# Patient Record
Sex: MALE | Race: BLACK OR AFRICAN AMERICAN | NOT HISPANIC OR LATINO | Employment: FULL TIME | URBAN - METROPOLITAN AREA
[De-identification: names, ages, dates, MRNs, and addresses within clinical notes are randomized per-mention and may not be internally consistent; named-entity substitution may affect disease eponyms.]

---

## 2021-08-13 ENCOUNTER — APPOINTMENT (OUTPATIENT)
Dept: LAB | Facility: MEDICAL CENTER | Age: 36
End: 2021-08-13
Payer: COMMERCIAL

## 2021-08-13 ENCOUNTER — OFFICE VISIT (OUTPATIENT)
Dept: FAMILY MEDICINE CLINIC | Facility: CLINIC | Age: 36
End: 2021-08-13
Payer: COMMERCIAL

## 2021-08-13 VITALS
DIASTOLIC BLOOD PRESSURE: 80 MMHG | TEMPERATURE: 96.5 F | WEIGHT: 178.4 LBS | BODY MASS INDEX: 24.16 KG/M2 | SYSTOLIC BLOOD PRESSURE: 128 MMHG | HEIGHT: 72 IN

## 2021-08-13 DIAGNOSIS — E55.9 VITAMIN D DEFICIENCY: ICD-10-CM

## 2021-08-13 DIAGNOSIS — D17.9 LIPOMA, UNSPECIFIED SITE: Primary | ICD-10-CM

## 2021-08-13 DIAGNOSIS — D17.9 LIPOMA, UNSPECIFIED SITE: ICD-10-CM

## 2021-08-13 DIAGNOSIS — E78.49 OTHER HYPERLIPIDEMIA: ICD-10-CM

## 2021-08-13 DIAGNOSIS — Z00.00 ROUTINE ADULT HEALTH MAINTENANCE: ICD-10-CM

## 2021-08-13 PROBLEM — K40.90 INGUINAL HERNIA, LEFT: Status: ACTIVE | Noted: 2018-03-21

## 2021-08-13 PROBLEM — D72.819 LEUKOPENIA: Status: ACTIVE | Noted: 2018-04-13

## 2021-08-13 PROBLEM — E78.5 HYPERLIPIDEMIA: Status: ACTIVE | Noted: 2020-05-28

## 2021-08-13 LAB
25(OH)D3 SERPL-MCNC: 47 NG/ML (ref 30–100)
ALBUMIN SERPL BCP-MCNC: 3.7 G/DL (ref 3.5–5)
ALP SERPL-CCNC: 44 U/L (ref 46–116)
ALT SERPL W P-5'-P-CCNC: 39 U/L (ref 12–78)
ANION GAP SERPL CALCULATED.3IONS-SCNC: 4 MMOL/L (ref 4–13)
AST SERPL W P-5'-P-CCNC: 38 U/L (ref 5–45)
BASOPHILS # BLD AUTO: 0.03 THOUSANDS/ΜL (ref 0–0.1)
BASOPHILS NFR BLD AUTO: 1 % (ref 0–1)
BILIRUB SERPL-MCNC: 0.6 MG/DL (ref 0.2–1)
BUN SERPL-MCNC: 10 MG/DL (ref 5–25)
CALCIUM SERPL-MCNC: 9.1 MG/DL (ref 8.3–10.1)
CHLORIDE SERPL-SCNC: 111 MMOL/L (ref 100–108)
CHOLEST SERPL-MCNC: 136 MG/DL (ref 50–200)
CO2 SERPL-SCNC: 27 MMOL/L (ref 21–32)
CREAT SERPL-MCNC: 1.01 MG/DL (ref 0.6–1.3)
EOSINOPHIL # BLD AUTO: 0.05 THOUSAND/ΜL (ref 0–0.61)
EOSINOPHIL NFR BLD AUTO: 2 % (ref 0–6)
ERYTHROCYTE [DISTWIDTH] IN BLOOD BY AUTOMATED COUNT: 12.6 % (ref 11.6–15.1)
EST. AVERAGE GLUCOSE BLD GHB EST-MCNC: 108 MG/DL
GFR SERPL CREATININE-BSD FRML MDRD: 110 ML/MIN/1.73SQ M
GLUCOSE P FAST SERPL-MCNC: 80 MG/DL (ref 65–99)
HBA1C MFR BLD: 5.4 %
HCT VFR BLD AUTO: 42 % (ref 36.5–49.3)
HDLC SERPL-MCNC: 68 MG/DL
HGB BLD-MCNC: 13.1 G/DL (ref 12–17)
IMM GRANULOCYTES # BLD AUTO: 0.01 THOUSAND/UL (ref 0–0.2)
IMM GRANULOCYTES NFR BLD AUTO: 0 % (ref 0–2)
LDLC SERPL CALC-MCNC: 61 MG/DL (ref 0–100)
LYMPHOCYTES # BLD AUTO: 1.33 THOUSANDS/ΜL (ref 0.6–4.47)
LYMPHOCYTES NFR BLD AUTO: 39 % (ref 14–44)
MCH RBC QN AUTO: 28.5 PG (ref 26.8–34.3)
MCHC RBC AUTO-ENTMCNC: 31.2 G/DL (ref 31.4–37.4)
MCV RBC AUTO: 92 FL (ref 82–98)
MONOCYTES # BLD AUTO: 0.29 THOUSAND/ΜL (ref 0.17–1.22)
MONOCYTES NFR BLD AUTO: 9 % (ref 4–12)
NEUTROPHILS # BLD AUTO: 1.71 THOUSANDS/ΜL (ref 1.85–7.62)
NEUTS SEG NFR BLD AUTO: 49 % (ref 43–75)
NRBC BLD AUTO-RTO: 0 /100 WBCS
PLATELET # BLD AUTO: 178 THOUSANDS/UL (ref 149–390)
PMV BLD AUTO: 11.4 FL (ref 8.9–12.7)
POTASSIUM SERPL-SCNC: 3.8 MMOL/L (ref 3.5–5.3)
PROT SERPL-MCNC: 7 G/DL (ref 6.4–8.2)
RBC # BLD AUTO: 4.59 MILLION/UL (ref 3.88–5.62)
SODIUM SERPL-SCNC: 142 MMOL/L (ref 136–145)
TRIGL SERPL-MCNC: 34 MG/DL
TSH SERPL DL<=0.05 MIU/L-ACNC: 0.98 UIU/ML (ref 0.36–3.74)
WBC # BLD AUTO: 3.42 THOUSAND/UL (ref 4.31–10.16)

## 2021-08-13 PROCEDURE — 36415 COLL VENOUS BLD VENIPUNCTURE: CPT

## 2021-08-13 PROCEDURE — 80053 COMPREHEN METABOLIC PANEL: CPT

## 2021-08-13 PROCEDURE — 80061 LIPID PANEL: CPT

## 2021-08-13 PROCEDURE — 83036 HEMOGLOBIN GLYCOSYLATED A1C: CPT

## 2021-08-13 PROCEDURE — 99385 PREV VISIT NEW AGE 18-39: CPT | Performed by: FAMILY MEDICINE

## 2021-08-13 PROCEDURE — 99203 OFFICE O/P NEW LOW 30 MIN: CPT | Performed by: FAMILY MEDICINE

## 2021-08-13 PROCEDURE — 82306 VITAMIN D 25 HYDROXY: CPT

## 2021-08-13 PROCEDURE — 84443 ASSAY THYROID STIM HORMONE: CPT

## 2021-08-13 PROCEDURE — 85025 COMPLETE CBC W/AUTO DIFF WBC: CPT

## 2021-08-13 RX ORDER — ATORVASTATIN CALCIUM 40 MG/1
40 TABLET, FILM COATED ORAL DAILY
COMMUNITY
Start: 2021-05-11 | End: 2022-02-14

## 2021-08-13 RX ORDER — ATORVASTATIN CALCIUM 40 MG/1
TABLET, FILM COATED ORAL
COMMUNITY
Start: 2021-05-11 | End: 2021-08-13 | Stop reason: SDUPTHER

## 2021-08-13 RX ORDER — ATORVASTATIN CALCIUM 20 MG/1
20 TABLET, FILM COATED ORAL DAILY
Qty: 90 TABLET | Refills: 1 | Status: SHIPPED | OUTPATIENT
Start: 2021-08-13 | End: 2022-02-18 | Stop reason: SDUPTHER

## 2021-08-13 RX ORDER — ERGOCALCIFEROL 1.25 MG/1
50000 CAPSULE ORAL WEEKLY
COMMUNITY
End: 2022-02-14

## 2021-08-16 PROBLEM — Z00.00 ROUTINE ADULT HEALTH MAINTENANCE: Status: ACTIVE | Noted: 2021-08-16

## 2021-08-16 NOTE — PROGRESS NOTES
Assessment/Plan:    79-year-old male with: Annual well visit discussed various safety and health maintenance issues including healthy diet like the Mediterranean diet exercise healthy weight as tolerated ample sleep stress reduction strategies discussed supportive care return parameters    No problem-specific Assessment & Plan notes found for this encounter  Diagnoses and all orders for this visit:    Lipoma, unspecified site  -     Ambulatory referral to Plastic Surgery; Future  -     Comprehensive metabolic panel; Future  -     CBC and differential; Future  -     TSH, 3rd generation with Free T4 reflex; Future  -     Lipid Panel with Direct LDL reflex; Future  -     Hemoglobin A1C; Future  -     Vitamin D 25 hydroxy; Future    Other hyperlipidemia  -     atorvastatin (LIPITOR) 20 mg tablet; Take 1 tablet (20 mg total) by mouth daily  -     Comprehensive metabolic panel; Future  -     CBC and differential; Future  -     TSH, 3rd generation with Free T4 reflex; Future  -     Lipid Panel with Direct LDL reflex; Future  -     Hemoglobin A1C; Future  -     Vitamin D 25 hydroxy; Future    Vitamin D deficiency  -     Comprehensive metabolic panel; Future  -     CBC and differential; Future  -     TSH, 3rd generation with Free T4 reflex; Future  -     Lipid Panel with Direct LDL reflex; Future  -     Hemoglobin A1C; Future  -     Vitamin D 25 hydroxy; Future    Routine adult health maintenance    Other orders  -     atorvastatin (LIPITOR) 40 mg tablet; Take 40 mg by mouth daily  -     Discontinue: atorvastatin (LIPITOR) 40 mg tablet; TAKE 1 TABLET BY MOUTH EVERY DAY AT NIGHT  -     hydrocortisone 2 5 % cream; Apply topically 2 (two) times a day  -     ergocalciferol (VITAMIN D2) 50,000 units; Take 50,000 Units by mouth once a week          Subjective:     Chief Complaint   Patient presents with    Physical Exam    ck chest lump     also with lump on back of head        Patient ID: Betty Balderas is a 38 y o  male     Patient is a 26-year-old male who presents for an annual well visit he admits being physically active in generally eats healthy diet he sleeps well no other health maintenance issues      The following portions of the patient's history were reviewed and updated as appropriate: allergies, current medications, past family history, past medical history, past social history, past surgical history and problem list     Review of Systems   Constitutional: Negative  HENT: Negative  Eyes: Negative  Respiratory: Negative  Cardiovascular: Negative  Gastrointestinal: Negative  Endocrine: Negative  Genitourinary: Negative  Musculoskeletal: Negative  Allergic/Immunologic: Negative  Neurological: Negative  Hematological: Negative  Psychiatric/Behavioral: Negative  All other systems reviewed and are negative  Objective:      /80 (BP Location: Left arm, Patient Position: Sitting, Cuff Size: Standard)   Temp (!) 96 5 °F (35 8 °C) (Tympanic)   Ht 6' (1 829 m)   Wt 80 9 kg (178 lb 6 4 oz)   BMI 24 20 kg/m²          Physical Exam  Constitutional:       Appearance: He is well-developed  HENT:      Head: Atraumatic  Right Ear: External ear normal       Left Ear: External ear normal    Eyes:      Conjunctiva/sclera: Conjunctivae normal       Pupils: Pupils are equal, round, and reactive to light  Cardiovascular:      Rate and Rhythm: Normal rate and regular rhythm  Heart sounds: Normal heart sounds  Pulmonary:      Effort: Pulmonary effort is normal  No respiratory distress  Breath sounds: Normal breath sounds  Abdominal:      General: Bowel sounds are normal  There is no distension  Palpations: Abdomen is soft  Tenderness: There is no abdominal tenderness  There is no guarding or rebound  Musculoskeletal:         General: Normal range of motion  Cervical back: Normal range of motion  Skin:     General: Skin is warm and dry  Neurological:      Mental Status: He is alert and oriented to person, place, and time  Cranial Nerves: No cranial nerve deficit  Psychiatric:         Behavior: Behavior normal          Thought Content:  Thought content normal          Judgment: Judgment normal

## 2021-08-16 NOTE — PROGRESS NOTES
Assessment/Plan:    42-year-old male with:  Lipoma hyperlipidemia and vitamin-D deficiency will refer to Plastic surgery will continue current medications discussed supportive care return parameters    No problem-specific Assessment & Plan notes found for this encounter  Diagnoses and all orders for this visit:    Lipoma, unspecified site  -     Ambulatory referral to Plastic Surgery; Future  -     Comprehensive metabolic panel; Future  -     CBC and differential; Future  -     TSH, 3rd generation with Free T4 reflex; Future  -     Lipid Panel with Direct LDL reflex; Future  -     Hemoglobin A1C; Future  -     Vitamin D 25 hydroxy; Future    Other hyperlipidemia  -     atorvastatin (LIPITOR) 20 mg tablet; Take 1 tablet (20 mg total) by mouth daily  -     Comprehensive metabolic panel; Future  -     CBC and differential; Future  -     TSH, 3rd generation with Free T4 reflex; Future  -     Lipid Panel with Direct LDL reflex; Future  -     Hemoglobin A1C; Future  -     Vitamin D 25 hydroxy; Future    Vitamin D deficiency  -     Comprehensive metabolic panel; Future  -     CBC and differential; Future  -     TSH, 3rd generation with Free T4 reflex; Future  -     Lipid Panel with Direct LDL reflex; Future  -     Hemoglobin A1C; Future  -     Vitamin D 25 hydroxy; Future    Other orders  -     atorvastatin (LIPITOR) 40 mg tablet; Take 40 mg by mouth daily  -     Discontinue: atorvastatin (LIPITOR) 40 mg tablet; TAKE 1 TABLET BY MOUTH EVERY DAY AT NIGHT  -     hydrocortisone 2 5 % cream; Apply topically 2 (two) times a day  -     ergocalciferol (VITAMIN D2) 50,000 units; Take 50,000 Units by mouth once a week          Subjective:     Chief Complaint   Patient presents with    Physical Exam    ck chest lump     also with lump on back of head        Patient ID: Poppy Wilson is a 38 y o  male      Patient is a 42-year-old male who presents to establish care in this practice he admits a lipoma on the back of his neck on the right side he would like removed he also has hyperlipidemia vitamin-D deficiency admits being stable medications denies acute complaints no fevers chills nausea vomiting no other complaints at this time      The following portions of the patient's history were reviewed and updated as appropriate: allergies, current medications, past family history, past medical history, past social history, past surgical history and problem list     Review of Systems   Constitutional: Negative  HENT: Negative  Eyes: Negative  Respiratory: Negative  Cardiovascular: Negative  Gastrointestinal: Negative  Endocrine: Negative  Genitourinary: Negative  Musculoskeletal: Negative  Allergic/Immunologic: Negative  Neurological: Negative  Hematological: Negative  Psychiatric/Behavioral: Negative  All other systems reviewed and are negative  Objective:      /80 (BP Location: Left arm, Patient Position: Sitting, Cuff Size: Standard)   Temp (!) 96 5 °F (35 8 °C) (Tympanic)   Ht 6' (1 829 m)   Wt 80 9 kg (178 lb 6 4 oz)   BMI 24 20 kg/m²          Physical Exam  Constitutional:       Appearance: He is well-developed  HENT:      Head: Atraumatic  Right Ear: External ear normal       Left Ear: External ear normal    Eyes:      Conjunctiva/sclera: Conjunctivae normal       Pupils: Pupils are equal, round, and reactive to light  Cardiovascular:      Rate and Rhythm: Normal rate and regular rhythm  Heart sounds: Normal heart sounds  Pulmonary:      Effort: Pulmonary effort is normal  No respiratory distress  Breath sounds: Normal breath sounds  Abdominal:      General: Bowel sounds are normal  There is no distension  Palpations: Abdomen is soft  Tenderness: There is no abdominal tenderness  There is no guarding or rebound  Musculoskeletal:         General: Normal range of motion  Cervical back: Normal range of motion     Skin:     General: Skin is warm and dry  Neurological:      Mental Status: He is alert and oriented to person, place, and time  Cranial Nerves: No cranial nerve deficit  Psychiatric:         Behavior: Behavior normal          Thought Content:  Thought content normal          Judgment: Judgment normal

## 2021-09-02 ENCOUNTER — TELEPHONE (OUTPATIENT)
Dept: FAMILY MEDICINE CLINIC | Facility: CLINIC | Age: 36
End: 2021-09-02

## 2022-01-26 ENCOUNTER — TELEPHONE (OUTPATIENT)
Dept: FAMILY MEDICINE CLINIC | Facility: CLINIC | Age: 37
End: 2022-01-26

## 2022-01-26 NOTE — TELEPHONE ENCOUNTER
patient called the office stating he would like to see a hernia doctor to having pain where he has his hernia surgery

## 2022-02-02 ENCOUNTER — LAB REQUISITION (OUTPATIENT)
Dept: LAB | Facility: HOSPITAL | Age: 37
End: 2022-02-02
Payer: COMMERCIAL

## 2022-02-02 DIAGNOSIS — D48.5 NEOPLASM OF UNCERTAIN BEHAVIOR OF SKIN: ICD-10-CM

## 2022-02-02 PROCEDURE — 88304 TISSUE EXAM BY PATHOLOGIST: CPT | Performed by: PATHOLOGY

## 2022-02-14 ENCOUNTER — CONSULT (OUTPATIENT)
Dept: SURGERY | Facility: CLINIC | Age: 37
End: 2022-02-14
Payer: COMMERCIAL

## 2022-02-14 VITALS
DIASTOLIC BLOOD PRESSURE: 80 MMHG | HEART RATE: 78 BPM | SYSTOLIC BLOOD PRESSURE: 120 MMHG | WEIGHT: 181.8 LBS | TEMPERATURE: 97.6 F | HEIGHT: 72 IN | BODY MASS INDEX: 24.62 KG/M2

## 2022-02-14 DIAGNOSIS — R10.32 LEFT GROIN PAIN: Primary | ICD-10-CM

## 2022-02-14 PROCEDURE — 99243 OFF/OP CNSLTJ NEW/EST LOW 30: CPT | Performed by: SURGERY

## 2022-02-14 NOTE — PROGRESS NOTES
Assessment/Plan:   Maverick Boykin is a 39 y o male who is here for assessment for right groin pain  He had a open inguinal hernia repair on the left in 2018  Since about October 2021 he has had some groin discomfort with extra strenuous activity  He is set changed his workout regimen  Does not notice a lumbar bulging denies any GI symptoms such as nausea or vomiting  On examination he has no recurrent hernia and a very healthy intact abdominal wall  Plan:  Continue NSAIDs as needed for groin pain, discussed that this is probably lifelong occasional aches and pains in the groin incision but there is no obvious ilioinguinal neuropathy or recurrence  See as needed  ______________________________________________________  CC:Inguinal Hernia (LIH x 4-5 months  No bulges to note  No specific pain but there is discomfort  He noted he has been working out more since June  )    HPI:  Maverick Boykin is a 39 y o male who was referred for evaluation of Inguinal Hernia (LIH x 4-5 months  No bulges to note  No specific pain but there is discomfort  He noted he has been working out more since June  )    Currently doing well  See assessment  Status post left inguinal hernia repair, open, with mesh, in 2018 at an outside hospital   Has changed his exercise regimen  He is a very fit 6 ft gentleman with a BMI of 24  He is very muscular  No blunt or bulge just occasional twinges of pain and he is here to make sure there is no recurrence          ROS:  General ROS: negative  negative for - chills, fatigue, fever or night sweats, weight loss  Respiratory ROS: no cough, shortness of breath, or wheezing  Cardiovascular ROS: no chest pain or dyspnea on exertion  Genito-Urinary ROS: no dysuria, trouble voiding, or hematuria  Musculoskeletal ROS: negative for - gait disturbance, joint pain or muscle pain  Neurological ROS: no TIA or stroke symptoms  Gastrointestinal: see HPI   No abdominal pain, melena, BRB unless specified in HPI  Skin ROS: no new rashes or lesions   Lymphatic ROS: no new adenopathy noted by pt  GYN ROS: see HPI, no new GYN history or bleeding noted  Psy ROS: no new mental or behavioral disturbances       Patient Active Problem List   Diagnosis    Elevation of level of transaminase and lactic acid dehydrogenase (LDH)    Encounter for general adult medical examination without abnormal findings    Hyperlipidemia    Inguinal hernia, left    Leukopenia    Residual hemorrhoidal skin tags    Vitamin D deficiency    Lipoma    Routine adult health maintenance         Allergies:  Patient has no known allergies  Current Outpatient Medications:     atorvastatin (LIPITOR) 20 mg tablet, Take 1 tablet (20 mg total) by mouth daily, Disp: 90 tablet, Rfl: 1    NON FORMULARY, Protein shakes/powders, Disp: , Rfl:     History reviewed  No pertinent past medical history  Past Surgical History:   Procedure Laterality Date    HEMORRHOID SURGERY      HERNIA REPAIR      LIPOMA RESECTION  02/2022       Family History   Problem Relation Age of Onset    Cancer Maternal Grandmother         unknown type    Throat cancer Maternal Grandfather     Dementia Paternal Grandmother         reports that he has never smoked  He has never used smokeless tobacco  He reports previous alcohol use  He reports current drug use  Drug: Marijuana  Labs:   Lab Results   Component Value Date    WBC 3 42 (L) 08/13/2021    HGB 13 1 08/13/2021    HCT 42 0 08/13/2021    MCV 92 08/13/2021     08/13/2021     Lab Results   Component Value Date    K 3 8 08/13/2021     (H) 08/13/2021    CO2 27 08/13/2021    BUN 10 08/13/2021    CREATININE 1 01 08/13/2021    GLUF 80 08/13/2021    CALCIUM 9 1 08/13/2021    AST 38 08/13/2021    ALT 39 08/13/2021    ALKPHOS 44 (L) 08/13/2021    EGFR 110 08/13/2021         Imaging: No new pertinent imaging studies           PHYSICAL EXAM    Vitals:    02/14/22 0851   BP: 120/80   Pulse: 78 Temp: 97 6 °F (36 4 °C)          PHYSICAL EXAM  General Appearance:    Alert, cooperative, no distress,    Head:    Normocephalic without obvious abnormality   Eyes:    PERRL, conjunctiva/corneas clear, EOM's intact        Neck:   Supple, no adenopathy, no JVD   Back:     Symmetric, no spinal or CVA tenderness   Lungs:     Clear to auscultation bilaterally, no wheezing or rhonchi   Heart:    Regular rate and rhythm, S1 and S2 normal, no murmur   Abdomen:     Soft, nontender  Sitting or standing with Valsalva maneuvers there is no hernia bilaterally and particular on the left side  The scar is well healed and intact  Extremities:   Extremities normal  No clubbing, cyanosis or edema   Psych:   Normal Affect   Neurologic:   CNII-XII intact  Strength symmetric, speech intact                                           Some portions of this record may have been generated with voice recognition software  There may be translation, syntax,  or grammatical errors  Occasional wrong word or "sound-a-like" substitutions may have occurred due to the inherent limitations of the voice recognition software  Read the chart carefully and recognize, using context, where substitutions may have occurred  If you have any questions, please contact the dictating provider for clarification or correction, as needed  This encounter has been coded by a non-certified coder         Darrick Romano MD    Date: 2/14/2022 Time: 9:19 AM

## 2022-02-18 ENCOUNTER — OFFICE VISIT (OUTPATIENT)
Dept: FAMILY MEDICINE CLINIC | Facility: CLINIC | Age: 37
End: 2022-02-18
Payer: COMMERCIAL

## 2022-02-18 VITALS
HEART RATE: 89 BPM | OXYGEN SATURATION: 98 % | DIASTOLIC BLOOD PRESSURE: 70 MMHG | TEMPERATURE: 98.6 F | SYSTOLIC BLOOD PRESSURE: 110 MMHG | BODY MASS INDEX: 24.38 KG/M2 | WEIGHT: 180 LBS | HEIGHT: 72 IN

## 2022-02-18 DIAGNOSIS — E78.49 OTHER HYPERLIPIDEMIA: ICD-10-CM

## 2022-02-18 DIAGNOSIS — E55.9 VITAMIN D DEFICIENCY: Primary | ICD-10-CM

## 2022-02-18 PROCEDURE — 99213 OFFICE O/P EST LOW 20 MIN: CPT | Performed by: FAMILY MEDICINE

## 2022-02-18 RX ORDER — CHOLECALCIFEROL (VITAMIN D3) 1250 MCG
1 CAPSULE ORAL WEEKLY
Qty: 12 CAPSULE | Refills: 1 | Status: SHIPPED | OUTPATIENT
Start: 2022-02-18

## 2022-02-18 RX ORDER — ATORVASTATIN CALCIUM 20 MG/1
20 TABLET, FILM COATED ORAL DAILY
Qty: 90 TABLET | Refills: 1 | Status: SHIPPED | OUTPATIENT
Start: 2022-02-18

## 2022-02-21 NOTE — PROGRESS NOTES
Assessment/Plan:    70-year-old male with: Vitamin-D deficiency and hyperlipidemia will continue current medications discussed supportive care return parameters follow-up in 6 months    No problem-specific Assessment & Plan notes found for this encounter  Diagnoses and all orders for this visit:    Vitamin D deficiency  -     Cholecalciferol (Vitamin D3) 1 25 MG (77540 UT) CAPS; Take 1 capsule (50,000 Units total) by mouth once a week    Other hyperlipidemia  -     atorvastatin (LIPITOR) 20 mg tablet; Take 1 tablet (20 mg total) by mouth daily          Subjective:     Chief Complaint   Patient presents with    Follow-up    Medication Management     pt needs vit d rx    Medication Refill    Hiatal Hernia    Genetics Lab Prep Work        Patient ID: Sandy Carpio is a 39 y o  male  Patient is a 70-year-old male who presents for follow-up on vitamin-D deficiency hyperlipidemia admits being stable medications denies acute complaints no fevers chills nausea vomiting tolerating p o  intake no other complaints at this time      The following portions of the patient's history were reviewed and updated as appropriate: allergies, current medications, past family history, past medical history, past social history, past surgical history and problem list     Review of Systems   Constitutional: Negative  HENT: Negative  Eyes: Negative  Respiratory: Negative  Cardiovascular: Negative  Gastrointestinal: Negative  Endocrine: Negative  Genitourinary: Negative  Musculoskeletal: Negative  Allergic/Immunologic: Negative  Neurological: Negative  Hematological: Negative  Psychiatric/Behavioral: Negative  All other systems reviewed and are negative  Objective:      /70   Pulse 89   Temp 98 6 °F (37 °C)   Ht 6' (1 829 m)   Wt 81 6 kg (180 lb)   SpO2 98%   BMI 24 41 kg/m²          Physical Exam  Constitutional:       Appearance: He is well-developed     HENT:      Head: Atraumatic  Right Ear: External ear normal       Left Ear: External ear normal    Eyes:      Conjunctiva/sclera: Conjunctivae normal       Pupils: Pupils are equal, round, and reactive to light  Cardiovascular:      Rate and Rhythm: Normal rate and regular rhythm  Heart sounds: Normal heart sounds  Pulmonary:      Effort: Pulmonary effort is normal  No respiratory distress  Breath sounds: Normal breath sounds  Abdominal:      General: There is no distension  Palpations: Abdomen is soft  Tenderness: There is no abdominal tenderness  There is no guarding or rebound  Musculoskeletal:         General: Normal range of motion  Cervical back: Normal range of motion  Skin:     General: Skin is warm and dry  Neurological:      Mental Status: He is alert and oriented to person, place, and time  Cranial Nerves: No cranial nerve deficit  Psychiatric:         Behavior: Behavior normal          Thought Content: Thought content normal          Judgment: Judgment normal            Depression Screening and Follow-up Plan: Patient was screened for depression during today's encounter  They screened negative with a PHQ-2 score of 0

## 2022-05-04 ENCOUNTER — OFFICE VISIT (OUTPATIENT)
Dept: PODIATRY | Facility: CLINIC | Age: 37
End: 2022-05-04
Payer: COMMERCIAL

## 2022-05-04 VITALS
HEART RATE: 55 BPM | SYSTOLIC BLOOD PRESSURE: 122 MMHG | WEIGHT: 173 LBS | DIASTOLIC BLOOD PRESSURE: 77 MMHG | BODY MASS INDEX: 23.46 KG/M2

## 2022-05-04 DIAGNOSIS — M76.822 POSTERIOR TIBIAL TENDON DYSFUNCTION, BILATERAL: Primary | ICD-10-CM

## 2022-05-04 DIAGNOSIS — M76.821 POSTERIOR TIBIAL TENDON DYSFUNCTION, BILATERAL: Primary | ICD-10-CM

## 2022-05-04 PROCEDURE — 99203 OFFICE O/P NEW LOW 30 MIN: CPT | Performed by: PODIATRIST

## 2022-05-04 NOTE — PROGRESS NOTES
This patient was seen on 5/4/22  My role is Foot , Ankle, and Wound Specialist    SUBJECTIVE    Chief Complaint:  Foot deformity     Patient ID: Austin Jones is a 39 y o  male  Daniel Cutting is here for the first time with a cc of "turning out" of his Right foot that he feels is worsening and also causing Right hip pain  He admits to being "flat footed" since youth  He denies foot pain  The following portions of the patient's history were reviewed and updated as appropriate: allergies, current medications, past family history, past medical history, past social history, past surgical history and problem list     Review of Systems   Constitutional: Negative  Cardiovascular: Negative  Musculoskeletal: Positive for gait problem  OBJECTIVE      /77   Pulse 55   Wt 78 5 kg (173 lb)   BMI 23 46 kg/m²     Foot/Ankle Musculoskeletal Exam    General      Neurological: alert      General additional comments: I note ROM of the hindfoot and midfoot are WNL bilaterally other than some gastrosoleal equinus-restricted dorsiflexion of the ankles  I note on weightbearing, he has almost complete collapse of his medial arch, abduction of the foot on the leg, Helbing's sign and eversion of the heels Right greater than Left  He is able to single and double heel rise bilaterally  I note a mild to moderate response to the Thohoyandou test Left greater than Right  Physical Exam  Vitals and nursing note reviewed  Constitutional:       General: He is not in acute distress  Appearance: Normal appearance  He is not ill-appearing, toxic-appearing or diaphoretic  HENT:      Head: Normocephalic and atraumatic  Pulmonary:      Effort: Pulmonary effort is normal       Breath sounds: Normal breath sounds  Musculoskeletal:         General: Deformity present  Comments: I note ROM of the hindfoot and midfoot are WNL bilaterally other than some gastrosoleal equinus-restricted dorsiflexion of the ankles   I note on weightbearing, he has almost complete collapse of his medial arch, abduction of the foot on the leg, Helbing's sign and eversion of the heels Right greater than Left  He is able to single and double heel rise bilaterally  I note a mild to moderate response to the Thohoyandou test Left greater than Right  Neurological:      Mental Status: He is alert  ASSESSMENT     Diagnoses and all orders for this visit:    Posterior tibial tendon dysfunction, bilateral  -     Device Prior Authorization; Future         Problem List Items Addressed This Visit        Musculoskeletal and Integument    Posterior tibial tendon dysfunction, bilateral - Primary    Relevant Orders    Device Prior Authorization              PLAN  I feel he has asymptomatic posterior tibial tendon dysfunction  It may be the cause of his hip pain  I recommend orthotic control  While it's painless now; his deformity is so profound that I feel joint, tendon pain are inevitable in the future if this is left unchecked  He will be scheduled for orthotic casting   If his hip pain doesn't resolve, he will need to see an orthopedic specialist

## 2022-06-23 ENCOUNTER — PROCEDURE VISIT (OUTPATIENT)
Dept: PODIATRY | Facility: CLINIC | Age: 37
End: 2022-06-23
Payer: COMMERCIAL

## 2022-06-23 VITALS — BODY MASS INDEX: 23.73 KG/M2 | WEIGHT: 175 LBS

## 2022-06-23 DIAGNOSIS — M76.821 POSTERIOR TIBIAL TENDON DYSFUNCTION, BILATERAL: Primary | ICD-10-CM

## 2022-06-23 DIAGNOSIS — M76.822 POSTERIOR TIBIAL TENDON DYSFUNCTION, BILATERAL: Primary | ICD-10-CM

## 2022-06-23 PROCEDURE — S0395 IMPRESSION CASTING FT: HCPCS | Performed by: PODIATRIST

## 2022-06-23 NOTE — PROGRESS NOTES
Date Patient Seen: 6/23/22       Subjective:     Chief Complaint:  Foot pain     Patient ID: Sofia Josemanuel is a 39 y o  male  Malon Perez is here to be casted for foot orthotics  The following portions of the patient's history were reviewed and updated as appropriate: allergies, current medications, past family history, past medical history, past social history, past surgical history and problem list     Review of Systems   Constitutional: Negative  Cardiovascular: Negative  Musculoskeletal: Positive for gait problem  Objective: Wt 79 4 kg (175 lb)   BMI 23 73 kg/m²        Physical Exam  Vitals and nursing note reviewed  Constitutional:       General: He is not in acute distress  Appearance: Normal appearance  He is not ill-appearing, toxic-appearing or diaphoretic  HENT:      Head: Normocephalic and atraumatic  Pulmonary:      Effort: Pulmonary effort is normal       Breath sounds: Normal breath sounds  Musculoskeletal:         General: Deformity present  Comments: I note ROM of the hindfoot and midfoot are WNL bilaterally other than some gastrosoleal equinus-restricted dorsiflexion of the ankles  I note on weightbearing, he has almost complete collapse of his medial arch, abduction of the foot on the leg, Helbing's sign and eversion of the heels Right greater than Left  He is able to single and double heel rise bilaterally  I note a mild to moderate response to the Roverto Pelt test Left greater than Right  Neurological:      Mental Status: He is alert  Diagnoses and all orders for this visit:    Posterior tibial tendon dysfunction, bilateral         Assessment:  Pes planus    Plan:     The patient was placed in the supine position  Plaster negative mold impressions were caputured of both feet with the the subtalar joints held in neutral position and the midtarsal joints maximally pronated and locked       The negative molds, when properly hardened, were removed from the patient and sent with prescription instructions to the orthotic laboratory       The patient will be notified when orthotics are available for dispensing at the office

## 2022-08-17 ENCOUNTER — OFFICE VISIT (OUTPATIENT)
Dept: PODIATRY | Facility: CLINIC | Age: 37
End: 2022-08-17
Payer: COMMERCIAL

## 2022-08-17 VITALS
SYSTOLIC BLOOD PRESSURE: 112 MMHG | WEIGHT: 178 LBS | DIASTOLIC BLOOD PRESSURE: 70 MMHG | HEART RATE: 70 BPM | BODY MASS INDEX: 24.14 KG/M2

## 2022-08-17 DIAGNOSIS — M76.822 POSTERIOR TIBIAL TENDON DYSFUNCTION, BILATERAL: Primary | ICD-10-CM

## 2022-08-17 DIAGNOSIS — M76.821 POSTERIOR TIBIAL TENDON DYSFUNCTION, BILATERAL: Primary | ICD-10-CM

## 2022-08-17 PROCEDURE — 99213 OFFICE O/P EST LOW 20 MIN: CPT | Performed by: PODIATRIST

## 2022-08-17 NOTE — PROGRESS NOTES
This patient was seen on 8/17/22  My role is Foot , Ankle, and Wound Specialist    SUBJECTIVE    Chief Complaint:  Pes planus     Patient ID: Matt Garcia is a 39 y o  male  Porfirio Power is here for the cc of "turning out" of his Right foot that he feels is worsening and also causing Right hip pain  He admits to being "flat footed" since youth  He denies foot pain  He's now wearing the custom foot orthotics  He notes the feet don't turn out and overall his feet are asymptomatic  His hip is still bothering him sometimes  The following portions of the patient's history were reviewed and updated as appropriate: allergies, current medications, past family history, past medical history, past social history, past surgical history and problem list     Review of Systems   Constitutional: Negative  Cardiovascular: Negative  Musculoskeletal: Positive for gait problem  OBJECTIVE      /70   Pulse 70   Wt 80 7 kg (178 lb)   BMI 24 14 kg/m²     Foot/Ankle Musculoskeletal Exam    General      Neurological: alert      General additional comments: I note ROM of the hindfoot and midfoot are WNL bilaterally other than some gastrosoleal equinus-restricted dorsiflexion of the ankles  I note on weightbearing, he has almost complete collapse of his medial arch, abduction of the foot on the leg, Helbing's sign and eversion of the heels Right greater than Left  He is able to single and double heel rise bilaterally  I note a mild to moderate response to the Thohoyandou test Left greater than Right  Physical Exam  Vitals and nursing note reviewed  Constitutional:       General: He is not in acute distress  Appearance: Normal appearance  He is not ill-appearing, toxic-appearing or diaphoretic  HENT:      Head: Normocephalic and atraumatic  Pulmonary:      Effort: Pulmonary effort is normal       Breath sounds: Normal breath sounds  Musculoskeletal:         General: Deformity present        Comments: I note ROM of the hindfoot and midfoot are WNL bilaterally other than some gastrosoleal equinus-restricted dorsiflexion of the ankles  I note on weightbearing, he has almost complete collapse of his medial arch, abduction of the foot on the leg, Helbing's sign and eversion of the heels Right greater than Left  He is able to single and double heel rise bilaterally  I note a mild to moderate response to the Buster Peacemaker test Left greater than Right  Neurological:      Mental Status: He is alert  ASSESSMENT     Diagnoses and all orders for this visit:    Posterior tibial tendon dysfunction, bilateral         Problem List Items Addressed This Visit        Musculoskeletal and Integument    Posterior tibial tendon dysfunction, bilateral - Primary              PLAN    The orthotics are effective for him  I did recommend he see an orthopedist for the hip pain  He's discharged from my service prn recurrence of foot discomfort

## 2022-08-19 ENCOUNTER — OFFICE VISIT (OUTPATIENT)
Dept: FAMILY MEDICINE CLINIC | Facility: CLINIC | Age: 37
End: 2022-08-19
Payer: COMMERCIAL

## 2022-08-19 ENCOUNTER — APPOINTMENT (OUTPATIENT)
Dept: LAB | Facility: MEDICAL CENTER | Age: 37
End: 2022-08-19
Payer: COMMERCIAL

## 2022-08-19 VITALS
SYSTOLIC BLOOD PRESSURE: 110 MMHG | TEMPERATURE: 97.9 F | WEIGHT: 178 LBS | HEIGHT: 73 IN | RESPIRATION RATE: 18 BRPM | BODY MASS INDEX: 23.59 KG/M2 | HEART RATE: 67 BPM | DIASTOLIC BLOOD PRESSURE: 70 MMHG | OXYGEN SATURATION: 99 %

## 2022-08-19 DIAGNOSIS — E55.9 VITAMIN D DEFICIENCY: ICD-10-CM

## 2022-08-19 DIAGNOSIS — M25.512 ARTHRALGIA OF LEFT ACROMIOCLAVICULAR JOINT: Primary | ICD-10-CM

## 2022-08-19 DIAGNOSIS — M25.551 RIGHT HIP PAIN: ICD-10-CM

## 2022-08-19 DIAGNOSIS — Z00.00 ROUTINE ADULT HEALTH MAINTENANCE: ICD-10-CM

## 2022-08-19 DIAGNOSIS — M25.512 ARTHRALGIA OF LEFT ACROMIOCLAVICULAR JOINT: ICD-10-CM

## 2022-08-19 DIAGNOSIS — E78.49 OTHER HYPERLIPIDEMIA: ICD-10-CM

## 2022-08-19 DIAGNOSIS — M25.532 LEFT WRIST PAIN: ICD-10-CM

## 2022-08-19 LAB
25(OH)D3 SERPL-MCNC: 22.4 NG/ML (ref 30–100)
ALBUMIN SERPL BCP-MCNC: 3.8 G/DL (ref 3.5–5)
ALP SERPL-CCNC: 54 U/L (ref 46–116)
ALT SERPL W P-5'-P-CCNC: 20 U/L (ref 12–78)
ANION GAP SERPL CALCULATED.3IONS-SCNC: 3 MMOL/L (ref 4–13)
AST SERPL W P-5'-P-CCNC: 18 U/L (ref 5–45)
BASOPHILS # BLD AUTO: 0.03 THOUSANDS/ΜL (ref 0–0.1)
BASOPHILS NFR BLD AUTO: 1 % (ref 0–1)
BILIRUB SERPL-MCNC: 0.38 MG/DL (ref 0.2–1)
BUN SERPL-MCNC: 14 MG/DL (ref 5–25)
CALCIUM SERPL-MCNC: 9 MG/DL (ref 8.3–10.1)
CHLORIDE SERPL-SCNC: 108 MMOL/L (ref 96–108)
CHOLEST SERPL-MCNC: 221 MG/DL
CO2 SERPL-SCNC: 28 MMOL/L (ref 21–32)
CREAT SERPL-MCNC: 1.18 MG/DL (ref 0.6–1.3)
EOSINOPHIL # BLD AUTO: 0.03 THOUSAND/ΜL (ref 0–0.61)
EOSINOPHIL NFR BLD AUTO: 1 % (ref 0–6)
ERYTHROCYTE [DISTWIDTH] IN BLOOD BY AUTOMATED COUNT: 12.1 % (ref 11.6–15.1)
EST. AVERAGE GLUCOSE BLD GHB EST-MCNC: 114 MG/DL
GFR SERPL CREATININE-BSD FRML MDRD: 78 ML/MIN/1.73SQ M
GLUCOSE P FAST SERPL-MCNC: 90 MG/DL (ref 65–99)
HBA1C MFR BLD: 5.6 %
HCT VFR BLD AUTO: 45.4 % (ref 36.5–49.3)
HDLC SERPL-MCNC: 63 MG/DL
HGB BLD-MCNC: 14.2 G/DL (ref 12–17)
IMM GRANULOCYTES # BLD AUTO: 0 THOUSAND/UL (ref 0–0.2)
IMM GRANULOCYTES NFR BLD AUTO: 0 % (ref 0–2)
LDLC SERPL CALC-MCNC: 151 MG/DL (ref 0–100)
LYMPHOCYTES # BLD AUTO: 1.47 THOUSANDS/ΜL (ref 0.6–4.47)
LYMPHOCYTES NFR BLD AUTO: 48 % (ref 14–44)
MCH RBC QN AUTO: 28.5 PG (ref 26.8–34.3)
MCHC RBC AUTO-ENTMCNC: 31.3 G/DL (ref 31.4–37.4)
MCV RBC AUTO: 91 FL (ref 82–98)
MONOCYTES # BLD AUTO: 0.26 THOUSAND/ΜL (ref 0.17–1.22)
MONOCYTES NFR BLD AUTO: 9 % (ref 4–12)
NEUTROPHILS # BLD AUTO: 1.26 THOUSANDS/ΜL (ref 1.85–7.62)
NEUTS SEG NFR BLD AUTO: 41 % (ref 43–75)
NRBC BLD AUTO-RTO: 0 /100 WBCS
PLATELET # BLD AUTO: 204 THOUSANDS/UL (ref 149–390)
PMV BLD AUTO: 11.3 FL (ref 8.9–12.7)
POTASSIUM SERPL-SCNC: 4.2 MMOL/L (ref 3.5–5.3)
PROT SERPL-MCNC: 7.4 G/DL (ref 6.4–8.4)
RBC # BLD AUTO: 4.98 MILLION/UL (ref 3.88–5.62)
SODIUM SERPL-SCNC: 139 MMOL/L (ref 135–147)
TRIGL SERPL-MCNC: 33 MG/DL
TSH SERPL DL<=0.05 MIU/L-ACNC: 0.9 UIU/ML (ref 0.45–4.5)
WBC # BLD AUTO: 3.05 THOUSAND/UL (ref 4.31–10.16)

## 2022-08-19 PROCEDURE — 85025 COMPLETE CBC W/AUTO DIFF WBC: CPT

## 2022-08-19 PROCEDURE — 80061 LIPID PANEL: CPT

## 2022-08-19 PROCEDURE — 99214 OFFICE O/P EST MOD 30 MIN: CPT | Performed by: FAMILY MEDICINE

## 2022-08-19 PROCEDURE — 82306 VITAMIN D 25 HYDROXY: CPT

## 2022-08-19 PROCEDURE — 84443 ASSAY THYROID STIM HORMONE: CPT

## 2022-08-19 PROCEDURE — 80053 COMPREHEN METABOLIC PANEL: CPT

## 2022-08-19 PROCEDURE — 83036 HEMOGLOBIN GLYCOSYLATED A1C: CPT

## 2022-08-19 PROCEDURE — 36415 COLL VENOUS BLD VENIPUNCTURE: CPT

## 2022-08-19 PROCEDURE — 99395 PREV VISIT EST AGE 18-39: CPT | Performed by: FAMILY MEDICINE

## 2022-08-19 RX ORDER — ATORVASTATIN CALCIUM 20 MG/1
20 TABLET, FILM COATED ORAL DAILY
Qty: 90 TABLET | Refills: 1 | Status: SHIPPED | OUTPATIENT
Start: 2022-08-19

## 2022-08-19 RX ORDER — CHOLECALCIFEROL (VITAMIN D3) 1250 MCG
1 CAPSULE ORAL WEEKLY
Qty: 12 CAPSULE | Refills: 1 | Status: SHIPPED | OUTPATIENT
Start: 2022-08-19

## 2022-08-22 NOTE — PROGRESS NOTES
Assessment/Plan:    58-year-old male with: left shoulder pain right hip pain along with left wrist pain and hyperlipidemia  Will check x-rays discussed heat and cold stretching anti-inflammatories will refer to physical therapy will continue medications as well  Regarding well visit discussed various safety and health maintenance issues including healthy diet like the Mediterranean diet exercise healthy weight as tolerated ample sleep stress reduction strategies discussed supportive care return parameters    No problem-specific Assessment & Plan notes found for this encounter  Diagnoses and all orders for this visit:    Arthralgia of left acromioclavicular joint  -     XR shoulder 2+ vw left; Future  -     XR wrist 3+ vw left; Future  -     XR hip/pelv 2-3 vws right if performed; Future  -     Ambulatory Referral to Physical Therapy; Future  -     CBC and differential; Future  -     Comprehensive metabolic panel; Future  -     TSH, 3rd generation with Free T4 reflex; Future  -     Lipid Panel with Direct LDL reflex; Future  -     Hemoglobin A1C; Future    Right hip pain  -     XR shoulder 2+ vw left; Future  -     XR wrist 3+ vw left; Future  -     XR hip/pelv 2-3 vws right if performed; Future  -     Ambulatory Referral to Physical Therapy; Future  -     CBC and differential; Future  -     Comprehensive metabolic panel; Future  -     TSH, 3rd generation with Free T4 reflex; Future  -     Lipid Panel with Direct LDL reflex; Future  -     Hemoglobin A1C; Future    Left wrist pain  -     XR shoulder 2+ vw left; Future  -     XR wrist 3+ vw left; Future  -     XR hip/pelv 2-3 vws right if performed; Future  -     Ambulatory Referral to Physical Therapy; Future  -     CBC and differential; Future  -     Comprehensive metabolic panel; Future  -     TSH, 3rd generation with Free T4 reflex; Future  -     Lipid Panel with Direct LDL reflex;  Future  -     Hemoglobin A1C; Future    Routine adult health maintenance  - CBC and differential; Future  -     Comprehensive metabolic panel; Future  -     TSH, 3rd generation with Free T4 reflex; Future  -     Lipid Panel with Direct LDL reflex; Future  -     Hemoglobin A1C; Future    Other hyperlipidemia  -     atorvastatin (LIPITOR) 20 mg tablet; Take 1 tablet (20 mg total) by mouth daily    Vitamin D deficiency  -     Cholecalciferol (Vitamin D3) 1 25 MG (19298 UT) CAPS; Take 1 capsule (50,000 Units total) by mouth once a week  -     Vitamin D 25 hydroxy; Future          Subjective:     Chief Complaint   Patient presents with    Well Check     Annual exam     Shoulder Pain    Hip Pain     Right side has been bothering him     Hernia        Patient ID: Pablo Bill is a 39 y o  male  Patient is a 19-year-old male who presents complaining of left shoulder pain right hip pain along with left wrist pain and hyperlipidemia he admits that it is been present off and on with activity over the past several months no fevers chills nausea vomiting tolerating p o  intake no other complaints at this time  Patient also here for annual well visit he admits being physically active generally eats healthy diet he sleeps well no other health maintenance issues      The following portions of the patient's history were reviewed and updated as appropriate: allergies, current medications, past family history, past medical history, past social history, past surgical history and problem list     Review of Systems   Constitutional: Negative  HENT: Negative  Eyes: Negative  Respiratory: Negative  Cardiovascular: Negative  Gastrointestinal: Negative  Endocrine: Negative  Genitourinary: Negative  Musculoskeletal: Positive for arthralgias and myalgias  Allergic/Immunologic: Negative  Neurological: Negative  Hematological: Negative  Psychiatric/Behavioral: Negative  All other systems reviewed and are negative          Objective:      /70 (BP Location: Right arm, Patient Position: Sitting, Cuff Size: Standard)   Pulse 67   Temp 97 9 °F (36 6 °C)   Resp 18   Ht 6' 0 5" (1 842 m)   Wt 80 7 kg (178 lb)   SpO2 99%   BMI 23 81 kg/m²          Physical Exam  Constitutional:       Appearance: He is well-developed  HENT:      Head: Atraumatic  Right Ear: External ear normal       Left Ear: External ear normal    Eyes:      Conjunctiva/sclera: Conjunctivae normal       Pupils: Pupils are equal, round, and reactive to light  Cardiovascular:      Rate and Rhythm: Normal rate and regular rhythm  Heart sounds: Normal heart sounds  Pulmonary:      Effort: Pulmonary effort is normal  No respiratory distress  Breath sounds: Normal breath sounds  Abdominal:      General: There is no distension  Palpations: Abdomen is soft  Tenderness: There is no abdominal tenderness  There is no guarding or rebound  Musculoskeletal:         General: Normal range of motion  Cervical back: Normal range of motion  Skin:     General: Skin is warm and dry  Neurological:      Mental Status: He is alert and oriented to person, place, and time  Cranial Nerves: No cranial nerve deficit  Psychiatric:         Behavior: Behavior normal          Thought Content:  Thought content normal          Judgment: Judgment normal

## 2022-08-25 ENCOUNTER — EVALUATION (OUTPATIENT)
Dept: PHYSICAL THERAPY | Facility: MEDICAL CENTER | Age: 37
End: 2022-08-25
Payer: COMMERCIAL

## 2022-08-25 DIAGNOSIS — M25.512 ARTHRALGIA OF LEFT ACROMIOCLAVICULAR JOINT: Primary | ICD-10-CM

## 2022-08-25 PROCEDURE — 97112 NEUROMUSCULAR REEDUCATION: CPT | Performed by: PHYSICAL THERAPIST

## 2022-08-25 PROCEDURE — 97161 PT EVAL LOW COMPLEX 20 MIN: CPT | Performed by: PHYSICAL THERAPIST

## 2022-08-25 NOTE — PROGRESS NOTES
PT Evaluation     Today's date: 2022  Patient name: Ananya Astudillo  : 1985  MRN: [id number]  Referring provider: Samy Ha MD  Dx:   Encounter Diagnosis     ICD-10-CM    1  Arthralgia of left acromioclavicular joint  M25 512 Ambulatory Referral to Physical Therapy     PT plan of care cert/re-cert                  Assessment  Assessment details: Mr Dayanna Wallace is a pleasant 39 y o  male who presents today with reports of left shoulder pain  No further referral appears neccessary at this time based upon examination findings  Patient presents with primary movement impairment diagnosis of left shoulder ACJ and GHJ hypomobility with poor motor control and strength of the scapular stabilizers resulting in mechanical left shoulder pain limiting his ability to do exercise and weight train  Patient would benefit from outpatient physical therapy services to address the observed impairments to reduce pain and improve function  Prognosis is good given compliance with PT attendance and HEP performance  Please contact me with any questions  Thank you for the referral   Impairments: abnormal muscle firing, abnormal or restricted ROM, activity intolerance, impaired physical strength, lacks appropriate home exercise program, scapular dyskinesis, poor posture  and poor body mechanics    Symptom irritability: lowUnderstanding of Dx/Px/POC: good   Prognosis: good    Goals  1  Patient will be independent in individualized HEP  2  Patient will achieve pain free shoulder elevation  3  Patient will improve strength of the scapular stabilizers to at least 4/5 to reduce stress at the [facility] joint and proximal insertion of the biceps tendon  4  Patient will be able to perform push-ups without limitation due to left shoulder pain  5  Patient will be able to weight train without limitation due to left shoulder pain  6  Patient will achieve score on FOTO by MDIC       Plan  Patient would benefit from: skilled physical therapy  Planned therapy interventions: joint mobilization, manual therapy, neuromuscular re-education, patient education, stretching, therapeutic activities, strengthening, therapeutic exercise, home exercise program, functional ROM exercises, activity modification, body mechanics training and postural training  Frequency: 1x week  Duration in weeks: 6  Plan of Care beginning date: 2022  Plan of Care expiration date: 10/7/2022  Treatment plan discussed with: patient        Subjective Evaluation    History of Present Illness  Mechanism of injury: Mr Dayanna Wallace is a 39 y o  male who presents today with reports of left shoulder, left wrist, and right hip pain  He would like to focus on the left shoulder to start and the other regions will be addressed going forward  Patient reports onset of left shoulder pain of gradual onset roughly one year ago  Patient believes his symptoms are exercise induced  He states roughly one year ago he began to increase his exercise  He states the pain has reduced with He reports superior left shoulder pain that is aggravated with weight lifting and push ups  Patient does not associate symptoms with numbness or tingling  Patient reports concern for further injuring his shoulder if he continues to lift without correcting his form  Patient's goals are to continue to maintain an active lifestyle  Pain  Current pain ratin  At best pain ratin  At worst pain ratin  Pain location: L superior shoulder, AC joint  Quality: sharp  Aggravating factors: overhead activity (push ups, weight lifting)    Social Support    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))  Hand dominance: right          Objective     Postural Observations  Seated posture: poor    Additional Postural Observation Details  Bilateral rounded and protracted shoulders    Tenderness     Left Shoulder   Tenderness in the [facility] joint, biceps tendon (proximal) and coracoid process   No tenderness in the infraspinatus tendon, subscapularis tendon and supraspinatus tendon  Cervical/Thoracic Screen   Cervical range of motion within normal limits    Active Range of Motion   Left Shoulder   Normal active range of motion  Abduction: WFL and with pain    Passive Range of Motion   Left Shoulder   Normal passive range of motion    Joint Play   Left Shoulder  Hypomobile in the posterior capsule, inferior capsule and AC joint  Strength/Myotome Testing     Left Shoulder     Planes of Motion   Flexion: 5   Abduction: 4+   External rotation at 0°: 5   External rotation at 90°: 4     Isolated Muscles   Biceps: 5   Infraspinatus: 5   Lower trapezius: 3   Middle trapezius: 3   Rhomboids: 3+   Subscapularis: 5   Supraspinatus: 5   Teres minor: 4+     Right Shoulder   Normal muscle strength    Tests     Left Shoulder   Positive painful arc and bicep load   Negative drop arm, empty can, external rotation lag sign, full can, Hawkin's, internal rotation lag sign, lift-off, Neer's, Speed's and Yergason's         Flowsheet Rows    Flowsheet Row Most Recent Value   PT/OT G-Codes    Current Score 69   Projected Score 81   FOTO information reviewed Yes   Assessment Type Evaluation             Precautions: hx hernia repair    HEP: scap 4  Manuals 8/25            L ACJ MET CK            L ACJ inferior glides nv            L GHJ posterior and inferior joint mobs nv                         Neuro Re-Ed             scap-4 No band x10 ea            Prone retraction nv            Prone raises 3 way nv                                                                Ther Ex             UBE nv            Supine punches nv            sidelying ER nv            Ball on wall             Wall slides                                                    Ther Activity                                       Gait Training                                       Modalities

## 2022-09-01 ENCOUNTER — OFFICE VISIT (OUTPATIENT)
Dept: PHYSICAL THERAPY | Facility: MEDICAL CENTER | Age: 37
End: 2022-09-01
Payer: COMMERCIAL

## 2022-09-01 DIAGNOSIS — M25.512 ARTHRALGIA OF LEFT ACROMIOCLAVICULAR JOINT: Primary | ICD-10-CM

## 2022-09-01 PROCEDURE — 97110 THERAPEUTIC EXERCISES: CPT | Performed by: PHYSICAL THERAPIST

## 2022-09-01 PROCEDURE — 97140 MANUAL THERAPY 1/> REGIONS: CPT | Performed by: PHYSICAL THERAPIST

## 2022-09-01 PROCEDURE — 97112 NEUROMUSCULAR REEDUCATION: CPT | Performed by: PHYSICAL THERAPIST

## 2022-09-01 NOTE — PROGRESS NOTES
Daily Note     Today's date: 2022  Patient name: Pablo Bill  : 1985  MRN: [id number]  Referring provider: Farshad Whatley MD  Dx:   Encounter Diagnosis     ICD-10-CM    1  Arthralgia of left acromioclavicular joint  M25 512                   Subjective: Patient reports he has been working out more with no increase in left shoulder pain  He reports less pain with push ups  Objective: See treatment diary below      Assessment: Today was patient's first treatment session back since initial evaluation  Patient presents with decreased left shoulder pain  Progressed interventions to focus on strength and motor control of the scapular stabilizers  Patient required maximal cueing for prone exercises to inhibit compensatory upper trap movements  He was able to progress to bands with scap-4 exercises  He had most difficulty with sequencing and motor coordination with  but was able to be corrected with cueing  Patient was provided bands for home and was instructed to perform prior to upper body strengthening  We did also spend time reviewing his mechanics with the bench press exercise  He does typically use a barbell so I reccommended performing with decreased weight and to not bring the bar fully to his chest  We did also review a DB option with altering the angle of his shoulder and to not drop past the bench  Plan: Continue per plan of care  Progress treatment as tolerated         Precautions: hx hernia repair    HEP: scap 4  Manuals            L ACJ MET CK CK           L ACJ inferior glides nv CK           L GHJ posterior and inferior joint mobs nv CK                        Neuro Re-Ed             scap-4 No band x10 ea RTB x30 ea           Prone retraction nv 3" x30           Prone raises 3 way nv x10 ea AA                                                               Ther Ex             UBE nv 3'/3'           Supine punches nv YTB 3x10           sidelying ER nv x30 Ball on wall             Wall slides  3x10                                                  Ther Activity                                       Gait Training                                       Modalities

## 2022-09-08 ENCOUNTER — OFFICE VISIT (OUTPATIENT)
Dept: PHYSICAL THERAPY | Facility: MEDICAL CENTER | Age: 37
End: 2022-09-08
Payer: COMMERCIAL

## 2022-09-08 DIAGNOSIS — M25.512 ARTHRALGIA OF LEFT ACROMIOCLAVICULAR JOINT: Primary | ICD-10-CM

## 2022-09-08 PROCEDURE — 97112 NEUROMUSCULAR REEDUCATION: CPT | Performed by: PHYSICAL THERAPIST

## 2022-09-08 PROCEDURE — 97110 THERAPEUTIC EXERCISES: CPT | Performed by: PHYSICAL THERAPIST

## 2022-09-08 PROCEDURE — 97140 MANUAL THERAPY 1/> REGIONS: CPT | Performed by: PHYSICAL THERAPIST

## 2022-09-08 NOTE — PROGRESS NOTES
Daily Note     Today's date: 2022  Patient name: Alfreda Castano  : 1985  MRN: [id number]  Referring provider: Manuela Ramirez MD  Dx:   Encounter Diagnosis     ICD-10-CM    1  Arthralgia of left acromioclavicular joint  M25 512                   Subjective: Patient states left shoulder is feeling better  Objective: See treatment diary below      Assessment: Patient presents with improved posterior capsule mobility  He demonstrates improved scapular motor control and was able to progress resistance today  Patient educated in push up plus for added SA strengthening  Patient was instructed in mobility exercises to address thoracic spine tightness and lat tightness  Patient will incorporate these into his program  Overall, patient is responding well to PT interventions  He would benefit from continued PT to enhance HEP  Plan: Continue per plan of care  Progress treatment as tolerated         Precautions: hx hernia repair    HEP: scap 4  Manuals           L ACJ MET CK CK CK          L ACJ inferior glides nv CK CK          L GHJ posterior and inferior joint mobs nv CK CK                       Neuro Re-Ed             scap-4 No band x10 ea RTB x30 ea GTB x30 ea          Prone retraction nv 3" x30 3"x30          Prone raises 3 way nv x10 ea AA x15 ea                                                              Ther Ex             UBE nv 3'/3' 3'/3'          Supine punches nv YTB 3x10 YTB 3" 3x10          sidelying ER nv x30 1# 3x10          Foam roller stretch at wall for t/s extension and lats   10" x10          Wall slides  3x10 3x10          T/s extension stretch with foam roller   10" x10                                    Ther Activity                                       Gait Training                                       Modalities

## 2022-09-15 ENCOUNTER — OFFICE VISIT (OUTPATIENT)
Dept: PHYSICAL THERAPY | Facility: MEDICAL CENTER | Age: 37
End: 2022-09-15
Payer: COMMERCIAL

## 2022-09-15 ENCOUNTER — TELEPHONE (OUTPATIENT)
Dept: PODIATRY | Facility: CLINIC | Age: 37
End: 2022-09-15

## 2022-09-15 DIAGNOSIS — M25.512 ARTHRALGIA OF LEFT ACROMIOCLAVICULAR JOINT: Primary | ICD-10-CM

## 2022-09-15 PROCEDURE — 97140 MANUAL THERAPY 1/> REGIONS: CPT | Performed by: PHYSICAL THERAPIST

## 2022-09-15 PROCEDURE — 97112 NEUROMUSCULAR REEDUCATION: CPT | Performed by: PHYSICAL THERAPIST

## 2022-09-15 PROCEDURE — 97110 THERAPEUTIC EXERCISES: CPT | Performed by: PHYSICAL THERAPIST

## 2022-09-15 NOTE — PROGRESS NOTES
Daily Note     Today's date: 9/15/2022  Patient name: Richardson Dennison  : 1985  MRN: [id number]  Referring provider: Mandy Romero MD  Dx:   Encounter Diagnosis     ICD-10-CM    1  Arthralgia of left acromioclavicular joint  M25 512                   Subjective:  Patient reports his shoulder is doing well today, no new complaints at this time  Objective: See treatment diary below      Assessment: Patient did well with all TE as listed, continue to progress as pt is able to tolerate  Plan: Continue per plan of care        Precautions: hx hernia repair    HEP: scap 4  Manuals 8/25 9/1 9/8 9/15         L ACJ MET CK CK CK          L ACJ inferior glides nv CK CK          L GHJ posterior and inferior joint mobs nv CK CK          PROM    HA         Neuro Re-Ed             scap-4 No band x10 ea RTB x30 ea GTB x30 ea GTB x30 ea         Prone retraction nv 3" x30 3"x30 3"x30         Prone raises 3 way nv x10 ea AA x15 ea x20 ea                                                             Ther Ex             UBE nv 3'/3' 3'/3' 3'/3'         Supine punches nv YTB 3x10 YTB 3" 3x10 5#  3" 3x10         sidelying ER nv x30 1# 3x10 2# 3x10         Foam roller stretch at wall for t/s extension and lats   10" x10 10"x10         Wall slides  3x10 3x10          T/s extension stretch with foam roller   10" x10 10"x10                                   Ther Activity                                       Gait Training                                       Modalities

## 2022-09-15 NOTE — TELEPHONE ENCOUNTER
Candido Lynn called, he was cast for orthotics on 6/23/22  As he was self pay, he paid $350 00 at that appointment  He received a bill for another $350 00 stating total charge $700 00  He would like to know how to get this fixed

## 2022-09-16 NOTE — TELEPHONE ENCOUNTER
Call to patient and explained the error in the self-pay process and that the billing office has been notified to correct this  Pt verbalized understanding to me

## 2022-09-22 ENCOUNTER — APPOINTMENT (OUTPATIENT)
Dept: PHYSICAL THERAPY | Facility: MEDICAL CENTER | Age: 37
End: 2022-09-22
Payer: COMMERCIAL

## 2022-09-29 ENCOUNTER — APPOINTMENT (OUTPATIENT)
Dept: PHYSICAL THERAPY | Facility: MEDICAL CENTER | Age: 37
End: 2022-09-29
Payer: COMMERCIAL

## 2022-10-12 PROBLEM — Z00.00 ROUTINE ADULT HEALTH MAINTENANCE: Status: RESOLVED | Noted: 2021-08-16 | Resolved: 2022-10-12

## 2022-10-21 ENCOUNTER — APPOINTMENT (OUTPATIENT)
Dept: LAB | Facility: MEDICAL CENTER | Age: 37
End: 2022-10-21
Payer: COMMERCIAL

## 2022-10-21 ENCOUNTER — OFFICE VISIT (OUTPATIENT)
Dept: FAMILY MEDICINE CLINIC | Facility: CLINIC | Age: 37
End: 2022-10-21
Payer: COMMERCIAL

## 2022-10-21 VITALS
BODY MASS INDEX: 24.11 KG/M2 | SYSTOLIC BLOOD PRESSURE: 120 MMHG | HEIGHT: 72 IN | OXYGEN SATURATION: 99 % | WEIGHT: 178 LBS | TEMPERATURE: 98.6 F | HEART RATE: 70 BPM | DIASTOLIC BLOOD PRESSURE: 82 MMHG

## 2022-10-21 DIAGNOSIS — Z01.84 IMMUNITY STATUS TESTING: Primary | ICD-10-CM

## 2022-10-21 DIAGNOSIS — Z01.84 IMMUNITY STATUS TESTING: ICD-10-CM

## 2022-10-21 DIAGNOSIS — E78.5 HYPERLIPIDEMIA, UNSPECIFIED HYPERLIPIDEMIA TYPE: ICD-10-CM

## 2022-10-21 LAB
HBV SURFACE AB SER-ACNC: 224.09 MIU/ML
RUBV IGG SERPL IA-ACNC: >175 IU/ML

## 2022-10-21 PROCEDURE — 86762 RUBELLA ANTIBODY: CPT

## 2022-10-21 PROCEDURE — 36415 COLL VENOUS BLD VENIPUNCTURE: CPT

## 2022-10-21 PROCEDURE — 86480 TB TEST CELL IMMUN MEASURE: CPT

## 2022-10-21 PROCEDURE — 86706 HEP B SURFACE ANTIBODY: CPT

## 2022-10-21 PROCEDURE — 86735 MUMPS ANTIBODY: CPT

## 2022-10-21 PROCEDURE — 87389 HIV-1 AG W/HIV-1&-2 AB AG IA: CPT

## 2022-10-21 PROCEDURE — 99213 OFFICE O/P EST LOW 20 MIN: CPT | Performed by: FAMILY MEDICINE

## 2022-10-21 PROCEDURE — 86765 RUBEOLA ANTIBODY: CPT

## 2022-10-22 LAB
MEV IGG SER QL IA: NORMAL
MUV IGG SER QL IA: NORMAL

## 2022-10-23 LAB — HIV 1+2 AB+HIV1 P24 AG SERPL QL IA: NORMAL

## 2022-10-24 LAB
GAMMA INTERFERON BACKGROUND BLD IA-ACNC: 0.03 IU/ML
M TB IFN-G BLD-IMP: NEGATIVE
M TB IFN-G CD4+ BCKGRND COR BLD-ACNC: 0.11 IU/ML
M TB IFN-G CD4+ BCKGRND COR BLD-ACNC: 0.12 IU/ML
MITOGEN IGNF BCKGRD COR BLD-ACNC: >10 IU/ML

## 2022-10-25 NOTE — PROGRESS NOTES
Assessment/Plan:    58-year-old male with:  Hyperlipidemia along with immunity status testing will check follow-up titers will continue current medication discussed supportive care return parameters    No problem-specific Assessment & Plan notes found for this encounter  Diagnoses and all orders for this visit:    Immunity status testing  -     Hepatitis B surface antibody; Future  -     Measles/Mumps/Rubella Immunity; Future  -     HIV 1/2 ANTIGEN/ANTIBODY (4TH GENERATION) W REFLEX [facility]; Future  -     Quantiferon TB Gold Plus; Future    Hyperlipidemia, unspecified hyperlipidemia type          Subjective:     Chief Complaint   Patient presents with   • Follow-up     Paper work to be competed for Dr tim ochoa        Patient ID: Katie Epstein is a 39 y o  male  Patient is a 58-year-old male who presents for follow-up on hyperlipidemia along with clearance for upcoming trip with doctors with help orders as he physical pharmacist admits being stable medications he denies chest pain or shortness of breath he admits good functional capacity no other complaints at this time he needs titers per their request      The following portions of the patient's history were reviewed and updated as appropriate: allergies, current medications, past family history, past medical history, past social history, past surgical history and problem list     Review of Systems   Constitutional: Negative  HENT: Negative  Eyes: Negative  Respiratory: Negative  Cardiovascular: Negative  Gastrointestinal: Negative  Endocrine: Negative  Genitourinary: Negative  Musculoskeletal: Negative  Allergic/Immunologic: Negative  Neurological: Negative  Hematological: Negative  Psychiatric/Behavioral: Negative  All other systems reviewed and are negative          Objective:      /82 (BP Location: Left arm, Patient Position: Sitting, Cuff Size: Standard)   Pulse 70   Temp 98 6 °F (37 °C) (Temporal) Ht 6' (1 829 m)   Wt 80 7 kg (178 lb)   SpO2 99%   BMI 24 14 kg/m²          Physical Exam  Constitutional:       Appearance: He is well-developed  HENT:      Head: Atraumatic  Right Ear: External ear normal       Left Ear: External ear normal    Eyes:      Conjunctiva/sclera: Conjunctivae normal       Pupils: Pupils are equal, round, and reactive to light  Cardiovascular:      Rate and Rhythm: Normal rate and regular rhythm  Heart sounds: Normal heart sounds  Pulmonary:      Effort: Pulmonary effort is normal  No respiratory distress  Breath sounds: Normal breath sounds  Abdominal:      General: There is no distension  Palpations: Abdomen is soft  Tenderness: There is no abdominal tenderness  There is no guarding or rebound  Musculoskeletal:         General: Normal range of motion  Cervical back: Normal range of motion  Skin:     General: Skin is warm and dry  Neurological:      Mental Status: He is alert and oriented to person, place, and time  Cranial Nerves: No cranial nerve deficit  Psychiatric:         Behavior: Behavior normal          Thought Content: Thought content normal          Judgment: Judgment normal            Depression Screening and Follow-up Plan: Patient was screened for depression during today's encounter  They screened negative with a PHQ-2 score of 0

## 2023-02-03 ENCOUNTER — OFFICE VISIT (OUTPATIENT)
Dept: FAMILY MEDICINE CLINIC | Facility: CLINIC | Age: 38
End: 2023-02-03

## 2023-02-03 VITALS
SYSTOLIC BLOOD PRESSURE: 110 MMHG | OXYGEN SATURATION: 98 % | HEART RATE: 66 BPM | WEIGHT: 188 LBS | BODY MASS INDEX: 25.5 KG/M2 | DIASTOLIC BLOOD PRESSURE: 80 MMHG

## 2023-02-03 DIAGNOSIS — G89.29 CHRONIC PAIN OF RIGHT ANKLE: ICD-10-CM

## 2023-02-03 DIAGNOSIS — M25.512 ARTHRALGIA OF LEFT ACROMIOCLAVICULAR JOINT: ICD-10-CM

## 2023-02-03 DIAGNOSIS — M25.571 CHRONIC PAIN OF RIGHT ANKLE: ICD-10-CM

## 2023-02-03 DIAGNOSIS — M21.6X1 PRONATION OF RIGHT FOOT: Primary | ICD-10-CM

## 2023-02-03 PROBLEM — M21.6X9 PRONATION OF FOOT: Status: ACTIVE | Noted: 2023-02-03

## 2023-02-06 NOTE — PROGRESS NOTES
Assessment/Plan:    74-year-old male:  pronation chronically of the right foot arthralgias of the AC joint and right ankle pain we will continue current medications or refer to orthopedics discussed procuring temporary medicine    No problem-specific Assessment & Plan notes found for this encounter  Diagnoses and all orders for this visit:    Pronation of right foot  -     Ambulatory Referral to Orthopedic Surgery; Future    Arthralgia of left acromioclavicular joint  -     Ambulatory Referral to Orthopedic Surgery; Future    Chronic pain of right ankle  -     Ambulatory Referral to Orthopedic Surgery; Future          Subjective:     Chief Complaint   Patient presents with   • Foot Injury     Right foot isues   • Follow-up     6 month check,    • Shoulder Pain     Left         Patient ID: Jordan Padilla is a 44 y o  male  Patient is a 74-year-old male who presents for follow-up on pronation chronically of the right foot arthralgias of the [facility] joint and right ankle pain no history of fevers chills nausea vomiting tolerating p o  tach no weakness no slurred tingling no other complaints at this time    Shoulder Pain         The following portions of the patient's history were reviewed and updated as appropriate: allergies, current medications, past family history, past medical history, past social history, past surgical history and problem list     Review of Systems   Constitutional: Negative  HENT: Negative  Eyes: Negative  Respiratory: Negative  Cardiovascular: Negative  Gastrointestinal: Negative  Endocrine: Negative  Genitourinary: Negative  Musculoskeletal: Positive for arthralgias and myalgias  Allergic/Immunologic: Negative  Neurological: Negative  Hematological: Negative  Psychiatric/Behavioral: Negative  All other systems reviewed and are negative          Objective:      /80   Pulse 66   Wt 85 3 kg (188 lb)   SpO2 98%   BMI 25 50 kg/m²          Physical Exam  Constitutional:       Appearance: He is well-developed  HENT:      Head: Atraumatic  Right Ear: External ear normal       Left Ear: External ear normal    Eyes:      Conjunctiva/sclera: Conjunctivae normal       Pupils: Pupils are equal, round, and reactive to light  Cardiovascular:      Rate and Rhythm: Normal rate and regular rhythm  Heart sounds: Normal heart sounds  Pulmonary:      Effort: Pulmonary effort is normal  No respiratory distress  Breath sounds: Normal breath sounds  Abdominal:      General: There is no distension  Palpations: Abdomen is soft  Tenderness: There is no abdominal tenderness  There is no guarding or rebound  Musculoskeletal:         General: Normal range of motion  Cervical back: Normal range of motion  Skin:     General: Skin is warm and dry  Neurological:      Mental Status: He is alert and oriented to person, place, and time  Cranial Nerves: No cranial nerve deficit  Psychiatric:         Behavior: Behavior normal          Thought Content: Thought content normal          Judgment: Judgment normal        BMI Counseling: Body mass index is 25 5 kg/m²  The BMI is above normal  Nutrition recommendations include encouraging healthy choices of fruits and vegetables  Exercise recommendations include moderate physical activity 150 minutes/week  Rationale for BMI follow-up plan is due to patient being overweight or obese  Depression Screening and Follow-up Plan: Patient was screened for depression during today's encounter  They screened negative with a PHQ-2 score of 0

## 2023-02-08 ENCOUNTER — APPOINTMENT (OUTPATIENT)
Dept: RADIOLOGY | Facility: AMBULARY SURGERY CENTER | Age: 38
End: 2023-02-08
Attending: ORTHOPAEDIC SURGERY

## 2023-02-08 ENCOUNTER — OFFICE VISIT (OUTPATIENT)
Dept: OBGYN CLINIC | Facility: CLINIC | Age: 38
End: 2023-02-08

## 2023-02-08 VITALS
BODY MASS INDEX: 23.7 KG/M2 | WEIGHT: 175 LBS | SYSTOLIC BLOOD PRESSURE: 120 MMHG | DIASTOLIC BLOOD PRESSURE: 73 MMHG | HEIGHT: 72 IN | HEART RATE: 84 BPM

## 2023-02-08 DIAGNOSIS — G89.29 CHRONIC PAIN OF RIGHT ANKLE: ICD-10-CM

## 2023-02-08 DIAGNOSIS — M25.571 CHRONIC PAIN OF RIGHT ANKLE: ICD-10-CM

## 2023-02-08 DIAGNOSIS — M21.6X1 PRONATION OF RIGHT FOOT: ICD-10-CM

## 2023-02-08 DIAGNOSIS — M76.829 POSTERIOR TIBIAL TENDON DYSFUNCTION: Primary | ICD-10-CM

## 2023-02-08 NOTE — PATIENT INSTRUCTIONS
[employer], [employer], [employer] are good brands but I recommend going to a dedicate shoe store (not [employer] or [employer] ) At these types of stores, they have experts that can fit you for shoes appropriate for your foot problem  Shoe choice is essential to solving/improving most types of foot pain  Even after a surgery, good shoes are necessary to keep the foot as comfortable as possible  [employer]  [address] [place] [address], [address] [address]  [facility], [address]  [phone]    [facility] [address] [facility],[address] [place], [address]  [address] [facility]  [address], [address] [facility]    [employer]  [address] [facility], [address] [facility]  [phone]    [facility]  [address] [facility], [facility], [facility], [address]  [phone]    [employer]  [address], [place], [address]    [address] [facility],[address]  [address] [facility], [address]  [address] [facility] [address], [address], [place], [address] [facility]   [phone]    Abbi Lima

## 2023-02-08 NOTE — PROGRESS NOTES
MIKY Toledo  Attending, Orthopaedic Surgery  Foot and [address] [facility] [address] [facility]        ORTHOPAEDIC FOOT AND ANKLE CLINIC VISIT     Assessment:     Encounter Diagnoses   Name Primary? • Pronation of right foot    • Chronic pain of right ankle    • Posterior tibial tendon dysfunction Yes              Plan:   · The patient verbalized understanding of exam findings and treatment plan  We engaged in the shared decision-making process and treatment options were discussed at length with the patient  Surgical and conservative management discussed today along with risks and benefits  · Mr Chanda Alexander has a Right correctable flat foot deformity that is responding well to custom orthotics made by Dr Ashley Jacobson  · He is in shoes today that do not provide adequate support  We counseled him on supportive shoe choices  · If he no longer is getting relief with orthotics will consider [place] brace  · His forefoot is significantly abducted but his Mearys angle on the lateral xray is normal     Return if symptoms worsen or fail to improve  History of Present Illness:   Chief Complaint:   Chief Complaint   Patient presents with   • Right Foot - Pain   • Right Ankle - Pain     Alessandro Mclaughlin is a 44 y o  male who is being seen for right foot pain  He noticed increased flat foot deformity over the past year  He has seen Dr Ashley Jacobson in the past for custom orthotics  The orthotics have helped him tremendously  He only has pain with walking barefoot and doing heel raises at the gym  Pain is localized at medial foot with minimal radiating and described as sharp and severe  Patient denies numbness, tingling or radicular pain  Denies history of neuropathy  Patient does not smoke, does not have diabetes and does not take blood thinners  Patient denies family history of anesthesia complications and has not had any complications with anesthesia       Pain/symptom timing:  Worse during the day when active  Pain/symptom context:  Worse with activites and work  Pain/symptom modifying factors:  Rest makes better, activities make worse  Pain/symptom associated signs/symptoms: none    Prior treatment   · NSAIDsNo    · Injections No   · Bracing/Orthotics Yes   · Physical Therapy No     Orthopedic Surgical History:   none    Past Medical, Surgical and Social History:  Past Medical History:  has a past medical history of Allergic and Hyperlipemia  Problem List: does not have any pertinent problems on file  Past Surgical History:  has a past surgical history that includes Hernia repair; Hemorrhoid surgery; and Lipoma resection (02/2022)  Family History: family history includes Cancer in his maternal grandmother; Dementia in his paternal grandmother; Hyperlipidemia in his mother; Hypertension in his father; Throat cancer in his maternal grandfather  Social History:  reports that he has never smoked  He has never used smokeless tobacco  He reports that he does not currently use alcohol  He reports current drug use  Drug: Marijuana  Current Medications: has a current medication list which includes the following prescription(s): atorvastatin, vitamin d3, and NON FORMULARY  Allergies: is allergic to pollen extract       Review of Systems:  General- denies fever/chills  HEENT- denies hearing loss or sore throat  Eyes- denies eye pain or visual disturbances, denies red eyes  Respiratory- denies cough or SOB  Cardio- denies chest pain or palpitations  GI- denies abdominal pain  Endocrine- denies urinary frequency  Urinary- denies pain with urination  Musculoskeletal- Negative except noted above  Skin- denies rashes or wounds  Neurological- denies dizziness or headache  Psychiatric- denies anxiety or difficulty concentrating    Physical Exam:   /73 (BP Location: Right arm, Patient Position: Sitting, Cuff Size: Adult)   Pulse 84   Ht 6' (1 829 m) Comment: verbal  Wt 79 4 kg (175 lb) Comment: verbal  BMI 23 73 kg/m²   General/Constitutional: No apparent distress: well-nourished and well developed  Eyes: normal ocular motion  Cardio: RRR, Normal S1S2, No m/r/g  Lymphatic: No appreciable lymphadenopathy  Respiratory: Non-labored breathing, CTA b/l no w/c/r  Vascular: No edema, swelling or tenderness, except as noted in detailed exam   Integumentary: No impressive skin lesions present, except as noted in detailed exam   Neuro: No ataxia or tremors noted  Psych: Normal mood and affect, oriented to person, place and time  Appropriate affect  Musculoskeletal: Normal, except as noted in detailed exam and in HPI  Examination    Right    Gait Normal   Musculoskeletal Nontender to palpation     Skin Normal       Nails Normal    Range of Motion  0 degrees dorsiflexion, 40 degrees plantarflexion  Subtalar motion: 30 eversion/0 inversion    Stability Stable    Muscle Strength 5/5 tibialis anterior  5/5 gastrocnemius-soleus  5/5 posterior tibialis  5/5 peroneal/eversion strength  5/5 EHL  5/5 FHL    Neurologic Normal    Sensation Intact to light touch throughout sural, saphenous, superficial peroneal, deep peroneal and medial/lateral plantar nerve distributions  [place]-Rosa 5 07 filament (10g) testing deferred  Cardiovascular Brisk capillary refill < 2 seconds,intact DP and PT pulses    Special Tests Correctable planovalgus deformity, Too many toes sign, able to single heel raise  Imaging Studies:   3 views of the right foot were taken, reviewed and interpreted independently that demonstrate increased talo-first metatarsal angle on AP foot xr  Reviewed by me personally  Joey Proctor Lachman, MD  Foot & Ankle Surgery   Department of [address] [place] [address]      I personally performed the service  Joey Proctor Lachman, MD

## 2023-02-14 ENCOUNTER — OFFICE VISIT (OUTPATIENT)
Dept: OBGYN CLINIC | Facility: CLINIC | Age: 38
End: 2023-02-14

## 2023-02-14 VITALS
SYSTOLIC BLOOD PRESSURE: 121 MMHG | HEIGHT: 72 IN | HEART RATE: 58 BPM | DIASTOLIC BLOOD PRESSURE: 78 MMHG | BODY MASS INDEX: 24.11 KG/M2 | WEIGHT: 178 LBS

## 2023-02-14 DIAGNOSIS — S49.92XA ACROMIOCLAVICULAR JOINT INJURY, LEFT, INITIAL ENCOUNTER: Primary | ICD-10-CM

## 2023-02-14 NOTE — PATIENT INSTRUCTIONS
F/u 6 wks  Begin physical therapy  Avoid activities that worsen pain  Icing/heat/OTC pain meds as needed

## 2023-02-14 NOTE — PROGRESS NOTES
[facility] [address]  [id number]  [address] [address] [phone]      Chief Complaint:  Chief Complaint   Patient presents with   • Left Shoulder - Pain       Vitals:  /78   Pulse 58   Ht 6' (1 829 m)   Wt 80 7 kg (178 lb)   BMI 24 14 kg/m²     The following portions of the patient's history were reviewed and updated as appropriate: allergies, current medications, past family history, past medical history, past social history, past surgical history, and problem list       Subjective:   Patient ID: Susan Herman is a 44 y o  male  Here c/o L shoulder pain  Worse with strenuous physical activity- weight lifting/pushups/bench  Pain started about 6 months ago  Went to PT for about 6 wks  No pain meds  Doing home exercises  Went to PT- helped  Pain during/after workout  Sharp with w/o, dull after      Review of Systems   Constitutional: Negative for fatigue and fever  Respiratory: Negative for shortness of breath  Cardiovascular: Negative for chest pain  Gastrointestinal: Negative for abdominal pain and nausea  Genitourinary: Negative for dysuria  Musculoskeletal: Positive for arthralgias  Skin: Negative for rash and wound  Neurological: Negative for weakness and headaches  Objective:  Left Shoulder Exam     Tenderness   The patient is experiencing no tenderness  Range of Motion   The patient has normal left shoulder ROM  Muscle Strength   The patient has normal left shoulder strength  Tests   Cross arm: negative    Comments: Ac provocative test pos            Physical Exam  Constitutional:       Appearance: Normal appearance  He is normal weight  Eyes:      Extraocular Movements: Extraocular movements intact  Pulmonary:      Effort: Pulmonary effort is normal    Musculoskeletal:         General: No tenderness  Cervical back: Normal range of motion  Skin:     General: Skin is warm and dry     Neurological: General: No focal deficit present  Mental Status: He is alert and oriented to person, place, and time  Mental status is at baseline  Psychiatric:         Mood and Affect: Mood normal          Behavior: Behavior normal          Thought Content: Thought content normal          Judgment: Judgment normal          I have personally reviewed pertinent films in PACS and my interpretation is XR-  L shoulder- nml study  Assessment/Plan:  Assessment/Plan   Diagnoses and all orders for this visit:    Acromioclavicular joint injury, left, initial encounter  -     Ambulatory Referral to Orthopedic Surgery  -     XR shoulder 2+ vw left; Future  -     Ambulatory Referral to Physical Therapy; Future    F/u 6 wks  Begin physical therapy  Avoid activities that worsen pain  Icing/heat/OTC pain meds as needed  Return in about 6 weeks (around 3/28/2023) for Recheck       Loren Hernandez MD

## 2023-02-14 NOTE — LETTER
February 14, 2023     Chadd Baker, [address]    Patient: Jerome Benson   YOB: 1985   Date of Visit: 2/14/2023       Dear Dr Miri Queen: Thank you for referring Jerome Benson to me for evaluation  Below are my notes for this consultation  If you have questions, please do not hesitate to call me  I look forward to following your patient along with you  Sincerely,        Ruddy Chauhan MD        CC: No Recipients  Ruddy Chauhan MD  2/14/2023  2:21 PM  Signed  [facility] [address] [place] [id number]  [address] [address] [phone]      Chief Complaint:  Chief Complaint   Patient presents with   • Left Shoulder - Pain       Vitals:  /78   Pulse 58   Ht 6' (1 829 m)   Wt 80 7 kg (178 lb)   BMI 24 14 kg/m²     The following portions of the patient's history were reviewed and updated as appropriate: allergies, current medications, past family history, past medical history, past social history, past surgical history, and problem list       Subjective:   Patient ID: Jerome Benson is a 44 y o  male  Here c/o L shoulder pain  Worse with strenuous physical activity- weight lifting/pushups/bench  Pain started about 6 months ago  Went to PT for about 6 wks  No pain meds  Doing home exercises  Went to PT- helped  Pain during/after workout  Sharp with w/o, dull after      Review of Systems   Constitutional: Negative for fatigue and fever  Respiratory: Negative for shortness of breath  Cardiovascular: Negative for chest pain  Gastrointestinal: Negative for abdominal pain and nausea  Genitourinary: Negative for dysuria  Musculoskeletal: Positive for arthralgias  Skin: Negative for rash and wound  Neurological: Negative for weakness and headaches  Objective:  Left Shoulder Exam     Tenderness   The patient is experiencing no tenderness       Range of Motion   The patient has normal left shoulder ROM  Muscle Strength   The patient has normal left shoulder strength  Tests   Cross arm: negative    Comments: Ac provocative test pos            Physical Exam  Constitutional:       Appearance: Normal appearance  He is normal weight  Eyes:      Extraocular Movements: Extraocular movements intact  Pulmonary:      Effort: Pulmonary effort is normal    Musculoskeletal:         General: No tenderness  Cervical back: Normal range of motion  Skin:     General: Skin is warm and dry  Neurological:      General: No focal deficit present  Mental Status: He is alert and oriented to person, place, and time  Mental status is at baseline  Psychiatric:         Mood and Affect: Mood normal          Behavior: Behavior normal          Thought Content: Thought content normal          Judgment: Judgment normal          I have personally reviewed pertinent films in PACS and my interpretation is XR-  L shoulder- nml study  Assessment/Plan:  Assessment/Plan   Diagnoses and all orders for this visit:    Acromioclavicular joint injury, left, initial encounter  -     Ambulatory Referral to Orthopedic Surgery  -     XR shoulder 2+ vw left; Future  -     Ambulatory Referral to Physical Therapy; Future        Return in about 6 weeks (around 3/28/2023) for Recheck       Michael Lamb MD

## 2023-04-27 ENCOUNTER — EVALUATION (OUTPATIENT)
Dept: PHYSICAL THERAPY | Facility: MEDICAL CENTER | Age: 38
End: 2023-04-27

## 2023-04-27 DIAGNOSIS — S49.92XA ACROMIOCLAVICULAR JOINT INJURY, LEFT, INITIAL ENCOUNTER: Primary | ICD-10-CM

## 2023-04-27 NOTE — PROGRESS NOTES
PT Evaluation     Today's date: 2023  Patient name: Stacey Winklerain  : 1985  MRN: [id number]  Referring provider: Taras Shaw MD  Dx:   Encounter Diagnosis     ICD-10-CM    1  Acromioclavicular joint injury, left, initial encounter  S49  92XA Ambulatory Referral to Physical Therapy                     Assessment  Assessment details: Stacey Palm is a pleasant 44 y o  male who presents with chronic L superolateral shoulder pain of a gradual onset for the last year after lifting  His pain has improved with a previous course of PT  However, he continues to have difficulty isolating the muscles of his anterior chest when lifting and continues to have discomfort in his L AC joint when performing bench press, overhead press,  press, and push-ups  No further referral is necessary at this time based upon examination results  Primary movement impairment is L scapular dyskinesis, which contributes to compensatory stress on his L RTC and AC joint when lifting overhead  Poor motor control and concurrent weakness in his L scapular stabilizers cause postural control dysfunction and limits his ability to isolate his anterior shoulder musculature when performing his exercise routine  Mild L posterior capsule tightness further contributes to excessive strain on L AC joint and limits exercise tolerance  Patient was educated in an HEP for proximal neuromotor control and was able to complete exercises without pain  Patient would benefit from skilled PT services to address the listed impairments to facilitate a return to PLOF   Thank you for the referral     Impairments: abnormal muscle firing, abnormal movement, activity intolerance, impaired physical strength, lacks appropriate home exercise program, pain with function and scapular dyskinesis  Functional limitations: lifting heavy- bench press, overhead press,  press, push-ups  Symptom irritability: lowBarriers to therapy: none  Understanding of Dx/Px/POC: good   Prognosis: good  Prognosis details: Positive prognostic factors include positive attitude towards recovery  Negative prognostic factors include chronicity of symptoms  Goals  STG:  Patient will be independent with home exercise program    Patient will be able to perform proper scapular retraction without compensation to demonstrate improved postural control for lifting  LTG:  Patient will increase strength of L middle and lower trapezius to at least 4+/5 to decrease compensatory stress on his L shoulder when lifting heavy  Patient will be able to perform overhead press/ press/bench press/push-ups with less AC joint pain  Patient will be able to manage symptoms independently  Plan  Plan details: Prognosis is above given PT services 1x/week tapering to 1x/every other week for the next 6 weeks and given HEP adherence  Patient would benefit from: skilled physical therapy  Referral necessary: No  Planned modality interventions: cryotherapy and thermotherapy: hydrocollator packs  Planned therapy interventions: activity modification, body mechanics training, flexibility, functional ROM exercises, graded exercise, graded activity, home exercise program, joint mobilization, manual therapy, massage, Schaefer taping, neuromuscular re-education, patient education, postural training, strengthening, stretching, therapeutic activities and therapeutic exercise  Frequency: 1x/week tapering to 1x/every other week  Duration in weeks: 6  Treatment plan discussed with: patient        Subjective Evaluation    History of Present Illness  Mechanism of injury: This is a 43 yo male presenting with L shoulder pain for the last year of a gradual onset after lifting  His pain has decreased in intensity after a previous course of PT  However, he continues to have discomfort in the superolateral aspect of his shoulder that is worsened with bench pressing, overhead press,  press, and push-ups   He notes that he is having difficulty noticing muscle definition in his chest muscles despite his workout routine  Quality of life: good    Pain  Current pain ratin  At best pain ratin  At worst pain ratin  Location: L AC joint  Quality: discomfort  Aggravating factors: lifting  Progression: improved    Social Support    Employment status: working    Diagnostic Tests  X-ray: normal  Treatments  Previous treatment: physical therapy  Patient Goals  Patient goals for therapy: increased strength and return to sport/leisure activities          Objective     Static Posture     Shoulders  Rounded  Tenderness     Left Shoulder   Tenderness in the [facility] [place] joint       Active Range of Motion   Cervical/Thoracic Spine       Cervical    Flexion:  WFL  Extension:  WFL  Left rotation:  WFL  Right rotation:  WFL  Left Shoulder   Flexion: 175 degrees   Abduction: 180 degrees   External rotation BTH: T1   Internal rotation BTB: T9     Right Shoulder   Flexion: 175 degrees   Abduction: 180 degrees   External rotation BTH: T1   Internal rotation BTB: T10     Additional Active Range of Motion Details  Seated cervical FLX/EXT AROM repeated motion testing unremarkable    Passive Range of Motion   Left Shoulder   Flexion: 180 degrees   Abduction: 180 degrees   External rotation 0°: 85 degrees   External rotation 90°: 90 degrees   Internal rotation 90°: 40 degrees     Right Shoulder   Flexion: 175 degrees   Abduction: 175 degrees   External rotation 0°: 85 degrees   External rotation 90°: 90 degrees   Internal rotation 90°: 45 degrees     Scapular Mobility   Left Shoulder   Scapular Mobility with Shoulder to 90° FF   Scapular winging: moderate    Scapular Mobility beyond 90° FF   Scapular winging: moderate  Upward rotation: excessive    Right Shoulder   Scapular Mobility with Shoulder to 90° FF   Scapular winging: minimal    Scapular Mobility beyond 90° FF   Scapular winging: minimal    Joint Play   Left Shoulder  Joints within functional limits are the inferior capsule  Hypomobile in the posterior capsule  Right Shoulder  Joints within functional limits are the posterior capsule and inferior capsule  Strength/Myotome Testing     Left Shoulder     Planes of Motion   Flexion: 5   Abduction: 5   External rotation at 0°: 4+   Internal rotation at 0°: 5     Isolated Muscles   Biceps: 5   Lower trapezius: 3+   Middle trapezius: 3+   Triceps: 5     Right Shoulder     Planes of Motion   Flexion: 5   Abduction: 5   External rotation at 0°: 5   Internal rotation at 0°: 5     Isolated Muscles   Biceps: 5   Lower trapezius: 4-   Middle trapezius: 4-   Triceps: 5     Tests     Left Shoulder   Positive scapular relocation  (for improved strength) and scapular assistance test positive (for decreased pain)  Negative drop arm, external rotation lag sign, Hawkin's and passive horizontal adduction  Precautions: hx of hernia repair    HEP: scap 4 (no band)  Manuals 4/27            L GH posterior glides? Neuro Re-Ed             Supine serratus punches NV            Prone retraction NV            Prone ext NV            Prone raises- 3-way             Scap 4 x10 ea HEP no band                                      Ther Ex             Pulleys             UBE             Sidelying ER?              Wall ball circles             Cross body stretch                                                    Ther Activity                                       Gait Training                                       Modalities

## 2023-05-04 ENCOUNTER — OFFICE VISIT (OUTPATIENT)
Dept: PHYSICAL THERAPY | Facility: MEDICAL CENTER | Age: 38
End: 2023-05-04

## 2023-05-04 DIAGNOSIS — S49.92XA ACROMIOCLAVICULAR JOINT INJURY, LEFT, INITIAL ENCOUNTER: Primary | ICD-10-CM

## 2023-05-04 NOTE — PROGRESS NOTES
Daily Note     Today's date: 2023  Patient name: Ernestina Velásquez  : 1985  MRN: [id number]  Referring provider: Radha Apple MD  Dx:   Encounter Diagnosis     ICD-10-CM    1  Acromioclavicular joint injury, left, initial encounter  S49  92XA                      Subjective: Patient reports that his shoulder has been feeling good since last session with no major changes  He has been doing his HEP  Objective: See treatment diary below      Assessment: Initiated proximal neuromotor control, shoulder mobility, and RTC strengthening program as outlined below  Verbal cueing provided to prevent shoulder ADD during supine serratus punches  Tactile cueing also provided during prone scapular activation interventions to prevent UT compensation  In addition, min/mod cueing provided during scap 4 to prevent wrist EXT activation  Patient was able to correct form with verbal cueing  Patient was given tband for multiplanar scapular activation during HEP, which demonstrates good progress toward goals  Patient tolerated treatment well and was able to complete program without pain  Patient would benefit from continued PT to address impairments to maximize function  Plan: Continue per plan of care        Precautions: hx of hernia repair    HEP: scap 4 (YTB)  Manuals                                                                Neuro Re-Ed             Supine serratus punches NV 2x10           Prone retraction NV 3x5           Prone ext NV 3x5           Prone raises- 3-way             Scap 4 x10 ea HEP no band 2x10 ea YTB                                     Ther Ex             Pulleys  5'           UBE  3' FWD/3' BWK           Sidelying ER  x10 ea           Wall ball circles  x10 ea b/l CW/CCW           Cross body stretch                                                    Ther Activity                                       Gait Training                                       Modalities

## 2023-05-11 ENCOUNTER — OFFICE VISIT (OUTPATIENT)
Dept: PHYSICAL THERAPY | Facility: MEDICAL CENTER | Age: 38
End: 2023-05-11

## 2023-05-11 DIAGNOSIS — S49.92XA ACROMIOCLAVICULAR JOINT INJURY, LEFT, INITIAL ENCOUNTER: Primary | ICD-10-CM

## 2023-05-11 NOTE — PROGRESS NOTES
Daily Note     Today's date: 2023  Patient name: Brianna Martínez  : 1985  MRN: [id number]  Referring provider: Lo Gottlieb MD  Dx:   Encounter Diagnosis     ICD-10-CM    1  Acromioclavicular joint injury, left, initial encounter  S49  92XA                      Subjective: Patient reports that he has been feeling good since his last session with no pain  Objective: See treatment diary below      Assessment: Continued with shoulder girdle mobility, RTC strengthening, and proximal scapular neuromotor control program as outlined below  Able to progress reps for supine and prone scapular activation interventions and resistance for standing scap 4, which demonstrates good progress toward goals  Min cueing provided to prevent compensation from shoulder girdle during prone retraction/ext  Patient is demonstrating good progress toward his overall goals  He was educated to add prone retraction to HEP to further improve postural control  Due to good progress toward goals, plan to hold on formal PT for patient to work on his HEP and progress his gym program  Plan to follow-up in 3 weeks for potential d/c  Goals  STG:  Patient will be independent with home exercise program - met  Patient will be able to perform proper scapular retraction without compensation to demonstrate improved postural control for lifting - met  LTG:  Patient will increase strength of L middle and lower trapezius to at least 4+/5 to decrease compensatory stress on his L shoulder when lifting heavy - in progress (improved)  Patient will be able to perform overhead press/ press/bench press/push-ups with less AC joint pain  - in progress (improved)  Patient will be able to manage symptoms independently  - in progress     Plan: Due to good progress toward goals, plan to hold on formal PT for 3 weeks for patient to work on his HEP and progress his gym program  Will follow-up in 3 weeks for re-assessment and potential d/c  Precautions: hx of hernia repair    HEP: scap 4 (YTB)  Manuals 4/27 5/4 5/11                                                              Neuro Re-Ed             Supine serratus punches NV 2x10 3x10          Prone retraction NV 3x5 4x5          Prone ext NV 3x5 4x5          Prone raises- 3-way             Scap 4 x10 ea HEP no band 2x10 ea YTB 2x10 ea RTB                                    Ther Ex             Pulleys  5' 5'          UBE  3' FWD/3' BWK 3' FWD/3' BWK          Sidelying ER  x10 ea x15 ea          Wall ball circles  x10 ea b/l CW/CCW x10 ea b/l CW/CCW          Cross body stretch                                                    Ther Activity                                       Gait Training                                       Modalities

## 2023-06-29 DIAGNOSIS — E78.49 OTHER HYPERLIPIDEMIA: ICD-10-CM

## 2023-06-29 RX ORDER — AZITHROMYCIN 250 MG/1
TABLET, FILM COATED ORAL
COMMUNITY
Start: 2023-06-22

## 2023-06-29 RX ORDER — ATORVASTATIN CALCIUM 20 MG/1
TABLET, FILM COATED ORAL
Qty: 90 TABLET | Refills: 0 | Status: SHIPPED | OUTPATIENT
Start: 2023-06-29

## 2023-08-09 ENCOUNTER — TELEPHONE (OUTPATIENT)
Dept: FAMILY MEDICINE CLINIC | Facility: CLINIC | Age: 38
End: 2023-08-09

## 2023-08-09 DIAGNOSIS — E78.5 HYPERLIPIDEMIA, UNSPECIFIED HYPERLIPIDEMIA TYPE: Primary | ICD-10-CM

## 2023-08-09 DIAGNOSIS — R73.01 IFG (IMPAIRED FASTING GLUCOSE): ICD-10-CM

## 2023-08-09 DIAGNOSIS — D72.819 LEUKOPENIA, UNSPECIFIED TYPE: ICD-10-CM

## 2023-08-09 NOTE — TELEPHONE ENCOUNTER
Left message labs were ordered and to fast 10 hours prior to labs drawn. Any questions to call office.

## 2023-08-21 ENCOUNTER — OFFICE VISIT (OUTPATIENT)
Dept: FAMILY MEDICINE CLINIC | Facility: CLINIC | Age: 38
End: 2023-08-21
Payer: COMMERCIAL

## 2023-08-21 ENCOUNTER — LAB (OUTPATIENT)
Dept: LAB | Facility: MEDICAL CENTER | Age: 38
End: 2023-08-21
Payer: COMMERCIAL

## 2023-08-21 VITALS
SYSTOLIC BLOOD PRESSURE: 120 MMHG | OXYGEN SATURATION: 95 % | BODY MASS INDEX: 25.06 KG/M2 | DIASTOLIC BLOOD PRESSURE: 80 MMHG | WEIGHT: 185 LBS | HEIGHT: 72 IN | TEMPERATURE: 98.5 F | HEART RATE: 80 BPM

## 2023-08-21 DIAGNOSIS — R73.01 IFG (IMPAIRED FASTING GLUCOSE): ICD-10-CM

## 2023-08-21 DIAGNOSIS — D72.819 LEUKOPENIA, UNSPECIFIED TYPE: ICD-10-CM

## 2023-08-21 DIAGNOSIS — E55.9 VITAMIN D DEFICIENCY: ICD-10-CM

## 2023-08-21 DIAGNOSIS — E78.5 HYPERLIPIDEMIA, UNSPECIFIED HYPERLIPIDEMIA TYPE: ICD-10-CM

## 2023-08-21 DIAGNOSIS — Z00.00 ANNUAL PHYSICAL EXAM: Primary | ICD-10-CM

## 2023-08-21 LAB
ALBUMIN SERPL BCP-MCNC: 3.9 G/DL (ref 3.5–5)
ALP SERPL-CCNC: 70 U/L (ref 46–116)
ALT SERPL W P-5'-P-CCNC: 22 U/L (ref 12–78)
ANION GAP SERPL CALCULATED.3IONS-SCNC: 4 MMOL/L
AST SERPL W P-5'-P-CCNC: 22 U/L (ref 5–45)
BASOPHILS # BLD AUTO: 0.02 THOUSANDS/ÂΜL (ref 0–0.1)
BASOPHILS NFR BLD AUTO: 1 % (ref 0–1)
BILIRUB SERPL-MCNC: 0.88 MG/DL (ref 0.2–1)
BUN SERPL-MCNC: 13 MG/DL (ref 5–25)
CALCIUM SERPL-MCNC: 9.3 MG/DL (ref 8.3–10.1)
CHLORIDE SERPL-SCNC: 110 MMOL/L (ref 96–108)
CHOLEST SERPL-MCNC: 168 MG/DL
CO2 SERPL-SCNC: 27 MMOL/L (ref 21–32)
CREAT SERPL-MCNC: 1.31 MG/DL (ref 0.6–1.3)
EOSINOPHIL # BLD AUTO: 0.09 THOUSAND/ÂΜL (ref 0–0.61)
EOSINOPHIL NFR BLD AUTO: 2 % (ref 0–6)
ERYTHROCYTE [DISTWIDTH] IN BLOOD BY AUTOMATED COUNT: 12.2 % (ref 11.6–15.1)
EST. AVERAGE GLUCOSE BLD GHB EST-MCNC: 117 MG/DL
GFR SERPL CREATININE-BSD FRML MDRD: 68 ML/MIN/1.73SQ M
GLUCOSE P FAST SERPL-MCNC: 88 MG/DL (ref 65–99)
HBA1C MFR BLD: 5.7 %
HCT VFR BLD AUTO: 46 % (ref 36.5–49.3)
HDLC SERPL-MCNC: 66 MG/DL
HGB BLD-MCNC: 14.7 G/DL (ref 12–17)
IMM GRANULOCYTES # BLD AUTO: 0.02 THOUSAND/UL (ref 0–0.2)
IMM GRANULOCYTES NFR BLD AUTO: 1 % (ref 0–2)
LDLC SERPL CALC-MCNC: 91 MG/DL (ref 0–100)
LYMPHOCYTES # BLD AUTO: 1.65 THOUSANDS/ÂΜL (ref 0.6–4.47)
LYMPHOCYTES NFR BLD AUTO: 43 % (ref 14–44)
MCH RBC QN AUTO: 28.7 PG (ref 26.8–34.3)
MCHC RBC AUTO-ENTMCNC: 32 G/DL (ref 31.4–37.4)
MCV RBC AUTO: 90 FL (ref 82–98)
MONOCYTES # BLD AUTO: 0.36 THOUSAND/ÂΜL (ref 0.17–1.22)
MONOCYTES NFR BLD AUTO: 9 % (ref 4–12)
NEUTROPHILS # BLD AUTO: 1.74 THOUSANDS/ÂΜL (ref 1.85–7.62)
NEUTS SEG NFR BLD AUTO: 44 % (ref 43–75)
NRBC BLD AUTO-RTO: 0 /100 WBCS
PLATELET # BLD AUTO: 191 THOUSANDS/UL (ref 149–390)
PMV BLD AUTO: 11.6 FL (ref 8.9–12.7)
POTASSIUM SERPL-SCNC: 3.5 MMOL/L (ref 3.5–5.3)
PROT SERPL-MCNC: 7.4 G/DL (ref 6.4–8.4)
RBC # BLD AUTO: 5.13 MILLION/UL (ref 3.88–5.62)
SODIUM SERPL-SCNC: 141 MMOL/L (ref 135–147)
TRIGL SERPL-MCNC: 55 MG/DL
TSH SERPL DL<=0.05 MIU/L-ACNC: 1.37 UIU/ML (ref 0.45–4.5)
WBC # BLD AUTO: 3.88 THOUSAND/UL (ref 4.31–10.16)

## 2023-08-21 PROCEDURE — 80061 LIPID PANEL: CPT

## 2023-08-21 PROCEDURE — 83036 HEMOGLOBIN GLYCOSYLATED A1C: CPT

## 2023-08-21 PROCEDURE — 80053 COMPREHEN METABOLIC PANEL: CPT

## 2023-08-21 PROCEDURE — 85025 COMPLETE CBC W/AUTO DIFF WBC: CPT

## 2023-08-21 PROCEDURE — 99395 PREV VISIT EST AGE 18-39: CPT | Performed by: FAMILY MEDICINE

## 2023-08-21 PROCEDURE — 36415 COLL VENOUS BLD VENIPUNCTURE: CPT

## 2023-08-21 PROCEDURE — 84443 ASSAY THYROID STIM HORMONE: CPT

## 2023-08-21 RX ORDER — METHOCARBAMOL 750 MG/1
50000 TABLET ORAL WEEKLY
Qty: 12 CAPSULE | Refills: 3 | Status: SHIPPED | OUTPATIENT
Start: 2023-08-21

## 2023-08-21 NOTE — PROGRESS NOTES
[facility]    NAME: Gume Arauz  AGE: 44 y.o. SEX: male  : 1985     DATE: 2023     Assessment and Plan:     Problem List Items Addressed This Visit        Other    Vitamin D deficiency    Relevant Medications    Cholecalciferol (D3-50) 1.25 MG (82857 UT) capsule   Other Visit Diagnoses     Annual physical exam    -  Primary    Relevant Orders    Lipid panel    Hemoglobin A1C    Comprehensive metabolic panel    CBC and Platelet    BMI 84.5-26.0,FNTEL              Immunizations and preventive care screenings were discussed with patient today. Appropriate education was printed on patient's after visit summary. Counseling:  Alcohol/drug use: discussed moderation in alcohol intake, the recommendations for healthy alcohol use, and avoidance of illicit drug use. Dental Health: discussed importance of regular tooth brushing, flossing, and dental visits. Injury prevention: discussed safety/seat belts, safety helmets, smoke detectors, carbon dioxide detectors, and smoking near bedding or upholstery. Sexual health: discussed sexually transmitted diseases, partner selection, use of condoms, avoidance of unintended pregnancy, and contraceptive alternatives. · Exercise: the importance of regular exercise/physical activity was discussed. Recommend exercise 3-5 times per week for at least 30 minutes. Depression Screening and Follow-up Plan: Patient was screened for depression during today's encounter. They screened negative with a PHQ-2 score of 0. Return in 1 year (on 2024). Chief Complaint:     Chief Complaint   Patient presents with   • Physical Exam     Caring starts with you. No other concerns  [facility]      History of Present Illness:     Adult Annual Physical   Patient here for a comprehensive physical exam. The patient reports no problems.     Diet and Physical Activity  · Diet/Nutrition: well balanced diet. · Exercise: moderate cardiovascular exercise. Depression Screening  PHQ-2/9 Depression Screening    Little interest or pleasure in doing things: 0 - not at all  Feeling down, depressed, or hopeless: 0 - not at all  PHQ-2 Score: 0  PHQ-2 Interpretation: Negative depression screen       General Health  · Sleep: sleeps well. · Hearing: normal - bilateral.  · Vision: no vision problems. · Dental: regular dental visits.  Health  · History of STDs?: no.     Review of Systems:     Review of Systems   Constitutional: Negative. HENT: Negative. Eyes: Negative. Respiratory: Negative. Cardiovascular: Negative. Gastrointestinal: Negative. Endocrine: Negative. Genitourinary: Negative. Musculoskeletal: Negative. Skin: Negative. Allergic/Immunologic: Negative. Neurological: Negative. Hematological: Negative. Psychiatric/Behavioral: Negative. All other systems reviewed and are negative.      Past Medical History:     Past Medical History:   Diagnosis Date   • Allergic    • Hyperlipemia       Past Surgical History:     Past Surgical History:   Procedure Laterality Date   • HEMORRHOID SURGERY     • HERNIA REPAIR     • LIPOMA RESECTION  02/2022      Social History:     Social History     Socioeconomic History   • Marital status: /Civil Union     Spouse name: None   • Number of children: None   • Years of education: None   • Highest education level: None   Occupational History   • Occupation: self employed   Tobacco Use   • Smoking status: Never   • Smokeless tobacco: Never   Vaping Use   • Vaping Use: Some days   • Substances: THC, CBD   Substance and Sexual Activity   • Alcohol use: Not Currently   • Drug use: Yes     Types: Marijuana     Comment: medical card  vaping "flowers"   • Sexual activity: Yes     Partners: Male, Female   Other Topics Concern   • None   Social History Narrative   • None     Social Determinants of Health     Financial Resource Strain: Not on file   Food Insecurity: Not on file   Transportation Needs: Not on file   Physical Activity: Not on file   Stress: Not on file   Social Connections: Not on file   Intimate Partner Violence: Not on file   Housing Stability: Not on file      Family History:     Family History   Problem Relation Age of Onset   • Cancer Maternal Grandmother         unknown type   • Throat cancer Maternal Grandfather    • Dementia Paternal Grandmother    • Hyperlipidemia Mother    • Hypertension Father       Current Medications:     Current Outpatient Medications   Medication Sig Dispense Refill   • atorvastatin (LIPITOR) 20 mg tablet TAKE ONE TABLET BY MOUTH EVERY DAY 90 tablet 0   • Cholecalciferol (D3-50) 1.25 MG (54766 UT) capsule Take 1 capsule (50,000 Units total) by mouth once a week 12 capsule 3   • azithromycin (ZITHROMAX) 250 mg tablet take 2 tablets by mouth IN THE EVENING ON DAY ONE then take 1 tablet DAILY ON DAYS 2 THROUGH 5 (Patient not taking: Reported on 8/21/2023)     • NON FORMULARY Protein shakes/powders (Patient not taking: Reported on 8/21/2023)       No current facility-administered medications for this visit. Allergies: Allergies   Allergen Reactions   • Pollen Extract Other (See Comments)      Physical Exam:     /80 (BP Location: Left arm, Patient Position: Sitting, Cuff Size: Standard)   Pulse 80   Temp 98.5 °F (36.9 °C) (Temporal)   Ht 6' (1.829 m)   Wt 83.9 kg (185 lb)   SpO2 95%   BMI 25.09 kg/m²     Physical Exam  Vitals and nursing note reviewed. Constitutional:       Appearance: Normal appearance. He is well-developed. HENT:      Head: Normocephalic. Nose: Nose normal.   Eyes:      Conjunctiva/sclera: Conjunctivae normal.      Pupils: Pupils are equal, round, and reactive to light. Cardiovascular:      Rate and Rhythm: Normal rate and regular rhythm. Pulmonary:      Effort: Pulmonary effort is normal.      Breath sounds: Normal breath sounds. Abdominal:      General: Bowel sounds are normal.      Palpations: Abdomen is soft. Musculoskeletal:         General: Normal range of motion. Cervical back: Normal range of motion and neck supple. Skin:     General: Skin is warm and dry. Neurological:      General: No focal deficit present. Mental Status: He is alert and oriented to person, place, and time. Psychiatric:         Mood and Affect: Mood normal.         Behavior: Behavior normal.         Thought Content:  Thought content normal.         Judgment: Judgment normal.          Malka Hugo,    [address] [place] [address]

## 2023-08-21 NOTE — PATIENT INSTRUCTIONS
Wellness Visit for Adults   AMBULATORY CARE:   A wellness visit  is when you see your healthcare provider to get screened for health problems. Your healthcare provider will also give you advice on how to stay healthy. Write down your questions so you remember to ask them. Ask your healthcare provider how often you should have a wellness visit. What happens at a wellness visit:  Your healthcare provider will ask about your health, and your family history of health problems. This includes high blood pressure, heart disease, and cancer. He or she will ask if you have symptoms that concern you, if you smoke, and about your mood. You may also be asked about your intake of medicines, supplements, food, and alcohol. Any of the following may be done:  • Your weight  will be checked. Your height may also be checked so your body mass index (BMI) can be calculated. Your BMI shows if you are at a healthy weight. • Your blood pressure  and heart rate will be checked. Your temperature may also be checked. • Blood and urine tests  may be done. Blood tests may be done to check your cholesterol levels. Abnormal cholesterol levels increase your risk for heart disease and stroke. You may also need a blood or urine test to check for diabetes if you are at increased risk. Urine tests may be done to look for signs of an infection or kidney disease. • A physical exam  includes checking your heartbeat and lungs with a stethoscope. Your healthcare provider may also check your skin to look for sun damage. • Screening tests  may be recommended. A screening test is done to check for diseases that may not cause symptoms. The screening tests you may need depend on your age, gender, family history, and lifestyle habits. For example, colorectal screening may be recommended if you are 48years old or older. Screening tests you need if you are a woman:   • A Pap smear  is used to screen for cervical cancer.  Pap smears are usually done every 3 to 5 years depending on your age. You may need them more often if you have had abnormal Pap smear test results in the past. Ask your healthcare provider how often you should have a Pap smear. • A mammogram  is an x-ray of your breasts to screen for breast cancer. Experts recommend mammograms every 2 years starting at age 48 years. You may need a mammogram at age 52 years or younger if you have an increased risk for breast cancer. Talk to your healthcare provider about when you should start having mammograms and how often you need them. Vaccines you may need:   • Get an influenza vaccine  every year. The influenza vaccine protects you from the flu. Several types of viruses cause the flu. The viruses change over time, so new vaccines are made each year. • Get a tetanus-diphtheria (Td) booster vaccine  every 10 years. This vaccine protects you against tetanus and diphtheria. Tetanus is a severe infection that may cause painful muscle spasms and lockjaw. Diphtheria is a severe bacterial infection that causes a thick covering in the back of your mouth and throat. • Get a human papillomavirus (HPV) vaccine  if you are female and aged 23 to 32 or male 23 to 24 and never received it. This vaccine protects you from HPV infection. HPV is the most common infection spread by sexual contact. HPV may also cause vaginal, penile, and anal cancers. • Get a pneumococcal vaccine  if you are aged 72 years or older. The pneumococcal vaccine is an injection given to protect you from pneumococcal disease. Pneumococcal disease is an infection caused by pneumococcal bacteria. The infection may cause pneumonia, meningitis, or an ear infection. • Get a shingles vaccine  if you are 60 or older, even if you have had shingles before. The shingles vaccine is an injection to protect you from the varicella-zoster virus. This is the same virus that causes chickenpox.  Shingles is a painful rash that develops in people who had chickenpox or have been exposed to the virus. How to eat healthy:  My Plate is a model for planning healthy meals. It shows the types and amounts of foods that should go on your plate. Fruits and vegetables make up about half of your plate, and grains and protein make up the other half. A serving of dairy is included on the side of your plate. The amount of calories and serving sizes you need depends on your age, gender, weight, and height. Examples of healthy foods are listed below:  • Eat a variety of vegetables  such as dark green, red, and orange vegetables. You can also include canned vegetables low in sodium (salt) and frozen vegetables without added butter or sauces. • Eat a variety of fresh fruits , canned fruit in 100% juice, frozen fruit, and dried fruit. • Include whole grains. At least half of the grains you eat should be whole grains. Examples include whole-wheat bread, wheat pasta, brown rice, and whole-grain cereals such as oatmeal.    • Eat a variety of protein foods such as seafood (fish and shellfish), lean meat, and poultry without skin (turkey and chicken). Examples of lean meats include pork leg, shoulder, or tenderloin, and beef round, sirloin, tenderloin, and extra lean ground beef. Other protein foods include eggs and egg substitutes, beans, peas, soy products, nuts, and seeds. • Choose low-fat dairy products such as skim or 1% milk or low-fat yogurt, cheese, and cottage cheese. • Limit unhealthy fats  such as butter, hard margarine, and shortening. Exercise:  Exercise at least 30 minutes per day on most days of the week. Some examples of exercise include walking, biking, dancing, and swimming. You can also fit in more physical activity by taking the stairs instead of the elevator or parking farther away from stores. Include muscle strengthening activities 2 days each week. Regular exercise provides many health benefits.  It helps you manage your weight, and decreases your risk for type 2 diabetes, heart disease, stroke, and high blood pressure. Exercise can also help improve your mood. Ask your healthcare provider about the best exercise plan for you. General health and safety guidelines:   • Do not smoke. Nicotine and other chemicals in cigarettes and cigars can cause lung damage. Ask your healthcare provider for information if you currently smoke and need help to quit. E-cigarettes or smokeless tobacco still contain nicotine. Talk to your healthcare provider before you use these products. • Limit alcohol. A drink of alcohol is 12 ounces of beer, 5 ounces of wine, or 1½ ounces of liquor. • Lose weight, if needed. Being overweight increases your risk of certain health conditions. These include heart disease, high blood pressure, type 2 diabetes, and certain types of cancer. • Protect your skin. Do not sunbathe or use tanning beds. Use sunscreen with a SPF 15 or higher. Apply sunscreen at least 15 minutes before you go outside. Reapply sunscreen every 2 hours. Wear protective clothing, hats, and sunglasses when you are outside. • Drive safely. Always wear your seatbelt. Make sure everyone in your car wears a seatbelt. A seatbelt can save your life if you are in an accident. Do not use your cell phone when you are driving. This could distract you and cause an accident. Pull over if you need to make a call or send a text message. • Practice safe sex. Use latex condoms if are sexually active and have more than one partner. Your healthcare provider may recommend screening tests for sexually transmitted infections (STIs). • Wear helmets, lifejackets, and protective gear. Always wear a helmet when you ride a bike or motorcycle, go skiing, or play sports that could cause a head injury. Wear protective equipment when you play sports. Wear a lifejacket when you are on a boat or doing water sports.     © Copyright [employer] 2022 Information is for End User's use only and may not be sold, redistributed or otherwise used for commercial purposes. The above information is an  only. It is not intended as medical advice for individual conditions or treatments. Talk to your doctor, nurse or pharmacist before following any medical regimen to see if it is safe and effective for you. Cholesterol and Your Health   AMBULATORY CARE:   Cholesterol  is a waxy, fat-like substance. Your body uses cholesterol to make hormones and new cells, and to protect nerves. Cholesterol is made by your body. It also comes from certain foods you eat, such as meat and dairy products. Your healthcare provider can help you set goals for your cholesterol levels. He or she can help you create a plan to meet your goals. Cholesterol level goals: Your cholesterol level goals depend on your risk for heart disease, your age, and your other health conditions. The following are general guidelines:  • Total cholesterol  includes low-density lipoprotein (LDL), high-density lipoprotein (HDL), and triglyceride levels. The total cholesterol level should be lower than 200 mg/dL and is best at about 150 mg/dL. • LDL cholesterol  is called bad cholesterol  because it forms plaque in your arteries. As plaque builds up, your arteries become narrow, and less blood flows through. When plaque decreases blood flow to your heart, you may have chest pain. If plaque completely blocks an artery that brings blood to your heart, you may have a heart attack. Plaque can break off and form blood clots. Blood clots may block arteries in your brain and cause a stroke. The level should be less than 130 mg/dL and is best at about 100 mg/dL. • HDL cholesterol  is called good cholesterol  because it helps remove LDL cholesterol from your arteries. It does this by attaching to LDL cholesterol and carrying it to your liver. Your liver breaks down LDL cholesterol so your body can get rid of it.  High levels of HDL cholesterol can help prevent a heart attack and stroke. Low levels of HDL cholesterol can increase your risk for heart disease, heart attack, and stroke. The level should be 60 mg/dL or higher. • Triglycerides  are a type of fat that store energy from foods you eat. High levels of triglycerides also cause plaque buildup. This can increase your risk for a heart attack or stroke. If your triglyceride level is high, your LDL cholesterol level may also be high. The level should be less than 150 mg/dL. Any of the following can increase your risk for high cholesterol:   • Smoking cigarettes    • Being overweight or obese, or not getting enough exercise    • Drinking large amounts of alcohol    • A medical condition such as hypertension (high blood pressure) or diabetes    • Certain genes passed from your parents to you    • Age older than 65 years    What you need to know about having your cholesterol levels checked: Adults 21to 39years of age should have their cholesterol levels checked every 4 to 6 years. Adults 45 years or older should have their cholesterol checked every 1 to 2 years. You may need your cholesterol checked more often, or at a younger age, if you have risk factors for heart disease. You may also need to have your cholesterol checked more often if you have other health conditions, such as diabetes. Blood tests are used to check cholesterol levels. Blood tests measure your levels of triglycerides, LDL cholesterol, and HDL cholesterol. How healthy fats affect your cholesterol levels:  Healthy fats, also called unsaturated fats, help lower LDL cholesterol and triglyceride levels. Healthy fats include the following:  • Monounsaturated fats  are found in foods such as olive oil, canola oil, avocado, nuts, and olives. • Polyunsaturated fats,  such as omega 3 fats, are found in fish, such as salmon, trout, and tuna.  They can also be found in plant foods such as flaxseed, walnuts, and soybeans. How unhealthy fats affect your cholesterol levels:  Unhealthy fats increase LDL cholesterol and triglyceride levels. They are found in foods high in cholesterol, saturated fat, and trans fat:  • Cholesterol  is found in eggs, dairy, and meat. • Saturated fat  is found in butter, cheese, ice cream, whole milk, and coconut oil. Saturated fat is also found in meat, such as sausage, hot dogs, and bologna. • Trans fat  is found in liquid oils and is used in fried and baked foods. Foods that contain trans fats include chips, crackers, muffins, sweet rolls, microwave popcorn, and cookies. Treatment  for high cholesterol will also decrease your risk of heart disease, heart attack, and stroke. Treatment may include any of the following:  • Lifestyle changes  may include food, exercise, weight loss, and quitting smoking. You may also need to decrease the amount of alcohol you drink. Your healthcare provider will want you to start with lifestyle changes. Other treatment may be added if lifestyle changes are not enough. Your healthcare provider may recommend you work with a team to manage hyperlipidemia. The team may include medical experts such as a dietitian, an exercise or physical therapist, and a behavior therapist. Your family members may be included in helping you create lifestyle changes. • Medicines  may be given to lower your LDL cholesterol, triglyceride levels, or total cholesterol level. You may need medicines to lower your cholesterol if any of the following is true:    ? You have a history of stroke, TIA, unstable angina, or a heart attack. ? Your LDL cholesterol level is 190 mg/dL or higher. ? You are age 36 to 76 years, have diabetes or heart disease risk factors, and your LDL cholesterol is 70 mg/dL or higher. • Supplements  include fish oil, red yeast rice, and garlic. Fish oil may help lower your triglyceride and LDL cholesterol levels.  It may also increase your HDL cholesterol level. Red yeast rice may help decrease your total cholesterol level and LDL cholesterol level. Garlic may help lower your total cholesterol level. Do not take any supplements without talking to your healthcare provider. Food changes you can make to lower your cholesterol levels:  A dietitian can help you create a healthy eating plan. He or she can show you how to read food labels and choose foods low in saturated fat, trans fats, and cholesterol. • Decrease the total amount of fat you eat. Choose lean meats, fat-free or 1% fat milk, and low-fat dairy products, such as yogurt and cheese. Try to limit or avoid red meats. Limit or do not eat fried foods or baked goods, such as cookies. • Replace unhealthy fats with healthy fats. Cook foods in olive oil or canola oil. Choose soft margarines that are low in saturated fat and trans fat. Seeds, nuts, and avocados are other examples of healthy fats. • Eat foods with omega-3 fats. Examples include salmon, tuna, mackerel, walnuts, and flaxseed. Eat fish 2 times per week. Pregnant women should not eat fish that have high levels of mercury, such as shark, swordfish, and rachel mackerel. • Increase the amount of high-fiber foods you eat. High-fiber foods can help lower your LDL cholesterol. Aim to get between 20 and 30 grams of fiber each day. Fruits and vegetables are high in fiber. Eat at least 5 servings each day. Other high-fiber foods are whole-grain or whole-wheat breads, pastas, or cereals, and brown rice. Eat 3 ounces of whole-grain foods each day. Increase fiber slowly. You may have abdominal discomfort, bloating, and gas if you add fiber to your diet too quickly. • Eat healthy protein foods. Examples include low-fat dairy products, skinless chicken and turkey, fish, and nuts. • Limit foods and drinks that are high in sugar.   Your dietitian or healthcare provider can help you create daily limits for high-sugar foods and drinks. The limit may be lower if you have diabetes or another health condition. Limits can also help you lose weight if needed. Lifestyle changes you can make to lower your cholesterol levels:   • Maintain a healthy weight. Ask your healthcare provider what a healthy weight is for you. Ask him or her to help you create a weight loss plan if needed. Weight loss can decrease your total cholesterol and triglyceride levels. Weight loss may also help keep your blood pressure at a healthy level. • Be physically active throughout the day. Physical activity, such as exercise, can help lower your total cholesterol level and maintain a healthy weight. Physical activity can also help increase your HDL cholesterol level. Work with your healthcare provider to create an program that is right for you. Get at least 30 to 40 minutes of moderate physical activity most days of the week. Examples of exercise include brisk walking, swimming, or biking. Also include strength training at least 2 times each week. Your healthcare providers can help you create a physical activity plan. • Do not smoke. Nicotine and other chemicals in cigarettes and cigars can raise your cholesterol levels. Ask your healthcare provider for information if you currently smoke and need help to quit. E-cigarettes or smokeless tobacco still contain nicotine. Talk to your healthcare provider before you use these products. • Limit or do not drink alcohol. Alcohol can increase your triglyceride levels. Ask your healthcare provider before you drink alcohol. Ask how much is okay for you to drink in 24 hours or 1 week. Follow up with your doctor as directed:  Write down your questions so you remember to ask them during your visits. © Copyright [employer] 2022 Information is for End User's use only and may not be sold, redistributed or otherwise used for commercial purposes. The above information is an  only.  It is not intended as medical advice for individual conditions or treatments. Talk to your doctor, nurse or pharmacist before following any medical regimen to see if it is safe and effective for you.

## 2023-08-23 NOTE — RESULT ENCOUNTER NOTE
Please call patient. Labs are stable, sugar is borderline, cholesterol improved significantly. Will discuss more fully at next visit.

## 2024-01-23 ENCOUNTER — OFFICE VISIT (OUTPATIENT)
Dept: PODIATRY | Facility: CLINIC | Age: 39
End: 2024-01-23
Payer: COMMERCIAL

## 2024-01-23 VITALS
DIASTOLIC BLOOD PRESSURE: 79 MMHG | BODY MASS INDEX: 23.92 KG/M2 | HEIGHT: 72 IN | SYSTOLIC BLOOD PRESSURE: 119 MMHG | WEIGHT: 176.6 LBS | HEART RATE: 67 BPM

## 2024-01-23 DIAGNOSIS — M21.6X1 PRONATION OF RIGHT FOOT: Primary | ICD-10-CM

## 2024-01-23 DIAGNOSIS — M76.821 POSTERIOR TIBIAL TENDON DYSFUNCTION, BILATERAL: ICD-10-CM

## 2024-01-23 DIAGNOSIS — M76.822 POSTERIOR TIBIAL TENDON DYSFUNCTION, BILATERAL: ICD-10-CM

## 2024-01-23 PROCEDURE — 99213 OFFICE O/P EST LOW 20 MIN: CPT | Performed by: PODIATRIST

## 2024-01-24 NOTE — PROGRESS NOTES
"This patient was seen on 1/23/24.    My role is Foot , Ankle, and Wound Specialist    SUBJECTIVE    Chief Complaint:  PTTD     Patient ID: Maynor Woods is a 38 y.o. male.    Maynor is here for the cc of \"turning out\" of his Right foot that he feels is worsening despite relatively consistent use of a custom foot orthotics. He purchased a pair of Hoka's to wear. He admits to being \"flat footed\" since youth. He denies foot pain.         The following portions of the patient's history were reviewed and updated as appropriate: allergies, current medications, past family history, past medical history, past social history, past surgical history and problem list.    Review of Systems   Constitutional: Negative.    Cardiovascular: Negative.    Musculoskeletal:  Positive for gait problem.         OBJECTIVE      /79   Pulse 67   Ht 6' (1.829 m)   Wt 80.1 kg (176 lb 9.6 oz)   BMI 23.95 kg/m²     Foot/Ankle Musculoskeletal Exam    General    Neurological: alert  General additional comments: I note ROM of the hindfoot and midfoot are WNL bilaterally other than some gastrosoleal equinus-restricted dorsiflexion of the ankles. I note on weightbearing, he has almost complete collapse of his medial arch, abduction of the foot on the leg, Helbing's sign and eversion of the heels Right greater than Left. He is able to single and double heel rise bilaterally. I note a mild to moderate response to the Gabino test Left greater than Right.       Physical Exam  Vitals and nursing note reviewed.   Constitutional:       General: He is not in acute distress.     Appearance: Normal appearance. He is not ill-appearing, toxic-appearing or diaphoretic.   HENT:      Head: Normocephalic and atraumatic.   Pulmonary:      Effort: Pulmonary effort is normal.      Breath sounds: Normal breath sounds.   Musculoskeletal:         General: Deformity present.      Comments: I note ROM of the hindfoot and midfoot are WNL bilaterally other than some " gastrosoleal equinus-restricted dorsiflexion of the ankles. I note on weightbearing, he has almost complete collapse of his medial arch, abduction of the foot on the leg, Helbing's sign and eversion of the heels Right greater than Left. He is able to single and double heel rise bilaterally. I note a mild to moderate response to the Gabino test Left greater than Right.   Neurological:      Mental Status: He is alert.             ASSESSMENT     Diagnoses and all orders for this visit:    Pronation of right foot    Posterior tibial tendon dysfunction, bilateral         Problem List Items Addressed This Visit          Musculoskeletal and Integument    Posterior tibial tendon dysfunction, bilateral       Other    Pronation of foot - Primary           PLAN    I recommend Hoka Arahi's. I recommend he remove the Hoka inserts so he can wear the custom orthotics at all times. I don't recommend surgery unless he becomes symptomatic.

## 2024-03-06 DIAGNOSIS — E78.49 OTHER HYPERLIPIDEMIA: ICD-10-CM

## 2024-03-06 RX ORDER — ATORVASTATIN CALCIUM 20 MG/1
TABLET, FILM COATED ORAL
Qty: 90 TABLET | Refills: 1 | Status: SHIPPED | OUTPATIENT
Start: 2024-03-06

## 2024-07-30 ENCOUNTER — APPOINTMENT (OUTPATIENT)
Dept: LAB | Facility: MEDICAL CENTER | Age: 39
End: 2024-07-30
Payer: COMMERCIAL

## 2024-07-30 ENCOUNTER — OFFICE VISIT (OUTPATIENT)
Dept: FAMILY MEDICINE CLINIC | Facility: CLINIC | Age: 39
End: 2024-07-30
Payer: COMMERCIAL

## 2024-07-30 ENCOUNTER — APPOINTMENT (OUTPATIENT)
Dept: LAB | Facility: MEDICAL CENTER | Age: 39
End: 2024-07-30

## 2024-07-30 VITALS
WEIGHT: 178 LBS | SYSTOLIC BLOOD PRESSURE: 118 MMHG | HEIGHT: 72 IN | HEART RATE: 76 BPM | OXYGEN SATURATION: 98 % | BODY MASS INDEX: 24.11 KG/M2 | TEMPERATURE: 97.3 F | DIASTOLIC BLOOD PRESSURE: 92 MMHG

## 2024-07-30 DIAGNOSIS — Z11.59 NEED FOR HEPATITIS C SCREENING TEST: ICD-10-CM

## 2024-07-30 DIAGNOSIS — Z13.29 SCREENING FOR THYROID DISORDER: ICD-10-CM

## 2024-07-30 DIAGNOSIS — Z00.8 HEALTH EXAMINATION IN POPULATION SURVEY: ICD-10-CM

## 2024-07-30 DIAGNOSIS — E78.5 HYPERLIPIDEMIA, UNSPECIFIED HYPERLIPIDEMIA TYPE: ICD-10-CM

## 2024-07-30 DIAGNOSIS — Z02.9 ADMINISTRATIVE ENCOUNTER: Primary | ICD-10-CM

## 2024-07-30 DIAGNOSIS — E55.9 VITAMIN D DEFICIENCY: ICD-10-CM

## 2024-07-30 DIAGNOSIS — D72.819 LEUKOPENIA, UNSPECIFIED TYPE: ICD-10-CM

## 2024-07-30 LAB
25(OH)D3 SERPL-MCNC: 50.2 NG/ML (ref 30–100)
ALBUMIN SERPL BCG-MCNC: 4.1 G/DL (ref 3.5–5)
ALP SERPL-CCNC: 51 U/L (ref 34–104)
ALT SERPL W P-5'-P-CCNC: 16 U/L (ref 7–52)
ANION GAP SERPL CALCULATED.3IONS-SCNC: 6 MMOL/L (ref 4–13)
AST SERPL W P-5'-P-CCNC: 20 U/L (ref 13–39)
BASOPHILS # BLD AUTO: 0.03 THOUSANDS/ÂΜL (ref 0–0.1)
BASOPHILS NFR BLD AUTO: 1 % (ref 0–1)
BILIRUB SERPL-MCNC: 0.65 MG/DL (ref 0.2–1)
BUN SERPL-MCNC: 11 MG/DL (ref 5–25)
CALCIUM SERPL-MCNC: 9.3 MG/DL (ref 8.4–10.2)
CHLORIDE SERPL-SCNC: 107 MMOL/L (ref 96–108)
CHOLEST SERPL-MCNC: 156 MG/DL
CO2 SERPL-SCNC: 27 MMOL/L (ref 21–32)
CREAT SERPL-MCNC: 1.23 MG/DL (ref 0.6–1.3)
EOSINOPHIL # BLD AUTO: 0.03 THOUSAND/ÂΜL (ref 0–0.61)
EOSINOPHIL NFR BLD AUTO: 1 % (ref 0–6)
ERYTHROCYTE [DISTWIDTH] IN BLOOD BY AUTOMATED COUNT: 12.1 % (ref 11.6–15.1)
EST. AVERAGE GLUCOSE BLD GHB EST-MCNC: 117 MG/DL
GFR SERPL CREATININE-BSD FRML MDRD: 73 ML/MIN/1.73SQ M
GLUCOSE P FAST SERPL-MCNC: 89 MG/DL (ref 65–99)
HBA1C MFR BLD: 5.7 %
HCT VFR BLD AUTO: 45.6 % (ref 36.5–49.3)
HCV AB SER QL: NORMAL
HDLC SERPL-MCNC: 66 MG/DL
HGB BLD-MCNC: 14.5 G/DL (ref 12–17)
IMM GRANULOCYTES # BLD AUTO: 0.01 THOUSAND/UL (ref 0–0.2)
IMM GRANULOCYTES NFR BLD AUTO: 0 % (ref 0–2)
LDLC SERPL CALC-MCNC: 82 MG/DL (ref 0–100)
LYMPHOCYTES # BLD AUTO: 1.39 THOUSANDS/ÂΜL (ref 0.6–4.47)
LYMPHOCYTES NFR BLD AUTO: 42 % (ref 14–44)
MCH RBC QN AUTO: 28.4 PG (ref 26.8–34.3)
MCHC RBC AUTO-ENTMCNC: 31.8 G/DL (ref 31.4–37.4)
MCV RBC AUTO: 89 FL (ref 82–98)
MONOCYTES # BLD AUTO: 0.27 THOUSAND/ÂΜL (ref 0.17–1.22)
MONOCYTES NFR BLD AUTO: 8 % (ref 4–12)
NEUTROPHILS # BLD AUTO: 1.57 THOUSANDS/ÂΜL (ref 1.85–7.62)
NEUTS SEG NFR BLD AUTO: 48 % (ref 43–75)
NONHDLC SERPL-MCNC: 90 MG/DL
NRBC BLD AUTO-RTO: 0 /100 WBCS
PLATELET # BLD AUTO: 186 THOUSANDS/UL (ref 149–390)
PMV BLD AUTO: 11.1 FL (ref 8.9–12.7)
POTASSIUM SERPL-SCNC: 4.4 MMOL/L (ref 3.5–5.3)
PROT SERPL-MCNC: 6.9 G/DL (ref 6.4–8.4)
RBC # BLD AUTO: 5.1 MILLION/UL (ref 3.88–5.62)
SODIUM SERPL-SCNC: 140 MMOL/L (ref 135–147)
TRIGL SERPL-MCNC: 41 MG/DL
TSH SERPL DL<=0.05 MIU/L-ACNC: 0.9 UIU/ML (ref 0.45–4.5)
WBC # BLD AUTO: 3.3 THOUSAND/UL (ref 4.31–10.16)

## 2024-07-30 PROCEDURE — 84443 ASSAY THYROID STIM HORMONE: CPT

## 2024-07-30 PROCEDURE — 36415 COLL VENOUS BLD VENIPUNCTURE: CPT

## 2024-07-30 PROCEDURE — 80061 LIPID PANEL: CPT

## 2024-07-30 PROCEDURE — 80053 COMPREHEN METABOLIC PANEL: CPT

## 2024-07-30 PROCEDURE — 85025 COMPLETE CBC W/AUTO DIFF WBC: CPT

## 2024-07-30 PROCEDURE — 83036 HEMOGLOBIN GLYCOSYLATED A1C: CPT

## 2024-07-30 PROCEDURE — 82306 VITAMIN D 25 HYDROXY: CPT

## 2024-07-30 PROCEDURE — 86803 HEPATITIS C AB TEST: CPT

## 2024-07-30 PROCEDURE — 99214 OFFICE O/P EST MOD 30 MIN: CPT | Performed by: STUDENT IN AN ORGANIZED HEALTH CARE EDUCATION/TRAINING PROGRAM

## 2024-07-30 NOTE — PROGRESS NOTES
Ambulatory Visit  Name: Maynor Woods      : 1985      MRN: [id number]  Encounter Provider: Eugene Juarez MD  Encounter Date: 2024   Encounter department: [facility]    Assessment & Plan   1. Administrative encounter  2. Leukopenia, unspecified type  -     CBC and differential; Future  3. Hyperlipidemia, unspecified hyperlipidemia type  -     Comprehensive metabolic panel; Future  4. Screening for thyroid disorder  -     TSH, 3rd generation with Free T4 reflex; Future; Expected date: 2024  5. Need for hepatitis C screening test  -     Hepatitis C Antibody; Future  6. Vitamin D deficiency  -     Vitamin D 25 hydroxy; Future    Labs ordered as indicated above, will review results with patient..  Patient to return in 1 month for annual physical.       History of Present Illness     HPI  Patient presenting today for screening and to obtain additional labs for caring start with you.  Patient has no acute complaints during today's visit.    Review of Systems   Constitutional:  Negative for chills and fever.   HENT:  Negative for sore throat.    Respiratory:  Negative for cough and shortness of breath.    Cardiovascular:  Negative for chest pain and palpitations.   Gastrointestinal:  Negative for abdominal pain, constipation, diarrhea, nausea and vomiting.   Genitourinary:  Negative for difficulty urinating and dysuria.   Neurological:  Negative for dizziness and headaches.       Objective     /92 (BP Location: Right arm, Patient Position: Sitting, Cuff Size: Standard)   Pulse 76   Temp (!) 97.3 °F (36.3 °C) (Temporal)   Ht 6' (1.829 m)   Wt 80.7 kg (178 lb)   SpO2 98%   BMI 24.14 kg/m²     Physical Exam  Constitutional:       Appearance: Normal appearance.   HENT:      Head: Normocephalic and atraumatic.   Cardiovascular:      Rate and Rhythm: Normal rate and regular rhythm.      Heart sounds: Normal heart sounds. No murmur heard.  Pulmonary:       Breath sounds: Normal breath sounds. No wheezing.   Abdominal:      Palpations: Abdomen is soft.      Tenderness: There is no abdominal tenderness. There is no guarding or rebound.   Musculoskeletal:      Right lower leg: No edema.      Left lower leg: No edema.   Neurological:      Mental Status: He is alert and oriented to person, place, and time.   Psychiatric:         Mood and Affect: Mood normal.         Behavior: Behavior normal.       Administrative Statements

## 2024-09-10 ENCOUNTER — OFFICE VISIT (OUTPATIENT)
Dept: FAMILY MEDICINE CLINIC | Facility: CLINIC | Age: 39
End: 2024-09-10
Payer: COMMERCIAL

## 2024-09-10 VITALS
BODY MASS INDEX: 24.35 KG/M2 | HEART RATE: 76 BPM | RESPIRATION RATE: 16 BRPM | OXYGEN SATURATION: 98 % | TEMPERATURE: 97.8 F | SYSTOLIC BLOOD PRESSURE: 122 MMHG | HEIGHT: 72 IN | WEIGHT: 179.8 LBS | DIASTOLIC BLOOD PRESSURE: 78 MMHG

## 2024-09-10 DIAGNOSIS — E55.9 VITAMIN D DEFICIENCY: ICD-10-CM

## 2024-09-10 DIAGNOSIS — Z00.00 ANNUAL PHYSICAL EXAM: Primary | ICD-10-CM

## 2024-09-10 DIAGNOSIS — Z71.82 EXERCISE COUNSELING: ICD-10-CM

## 2024-09-10 DIAGNOSIS — E78.5 HYPERLIPIDEMIA, UNSPECIFIED HYPERLIPIDEMIA TYPE: ICD-10-CM

## 2024-09-10 DIAGNOSIS — Z71.3 DIETARY COUNSELING: ICD-10-CM

## 2024-09-10 PROCEDURE — 99395 PREV VISIT EST AGE 18-39: CPT | Performed by: STUDENT IN AN ORGANIZED HEALTH CARE EDUCATION/TRAINING PROGRAM

## 2024-09-10 RX ORDER — ERGOCALCIFEROL 1.25 MG/1
CAPSULE ORAL
COMMUNITY

## 2024-09-10 NOTE — PATIENT INSTRUCTIONS
"Patient Education     Routine physical for adults   The Basics   Written by the doctors and editors at [facility]   What is a physical? -- A physical is a routine visit, or \"check-up,\" with your doctor. You might also hear it called a \"wellness visit\" or \"preventive visit.\"  During each visit, the doctor will:   Ask about your physical and mental health   Ask about your habits, behaviors, and lifestyle   Do an exam   Give you vaccines if needed   Talk to you about any medicines you take   Give advice about your health   Answer your questions  Getting regular check-ups is an important part of taking care of your health. It can help your doctor find and treat any problems you have. But it's also important for preventing health problems.  A routine physical is different from a \"sick visit.\" A sick visit is when you see a doctor because of a health concern or problem. Since physicals are scheduled ahead of time, you can think about what you want to ask the doctor.  How often should I get a physical? -- It depends on your age and health. In general, for people age 21 years and older:   If you are younger than 50 years, you might be able to get a physical every 3 years.   If you are 50 years or older, your doctor might recommend a physical every year.  If you have an ongoing health condition, like diabetes or high blood pressure, your doctor will probably want to see you more often.  What happens during a physical? -- In general, each visit will include:   Physical exam - The doctor or nurse will check your height, weight, heart rate, and blood pressure. They will also look at your eyes and ears. They will ask about how you are feeling and whether you have any symptoms that bother you.   Medicines - It's a good idea to bring a list of all the medicines you take to each doctor visit. Your doctor will talk to you about your medicines and answer any questions. Tell them if you are having any side effects that bother you. You " "should also tell them if you are having trouble paying for any of your medicines.   Habits and behaviors - This includes:   Your diet   Your exercise habits   Whether you smoke, drink alcohol, or use drugs   Whether you are sexually active   Whether you feel safe at home  Your doctor will talk to you about things you can do to improve your health and lower your risk of health problems. They will also offer help and support. For example, if you want to quit smoking, they can give you advice and might prescribe medicines. If you want to improve your diet or get more physical activity, they can help you with this, too.   Lab tests, if needed - The tests you get will depend on your age and situation. For example, your doctor might want to check your:   Cholesterol   Blood sugar   Iron level   Vaccines - The recommended vaccines will depend on your age, health, and what vaccines you already had. Vaccines are very important because they can prevent certain serious or deadly infections.   Discussion of screening - \"Screening\" means checking for diseases or other health problems before they cause symptoms. Your doctor can recommend screening based on your age, risk, and preferences. This might include tests to check for:   Cancer, such as breast, prostate, cervical, ovarian, colorectal, prostate, lung, or skin cancer   Sexually transmitted infections, such as chlamydia and gonorrhea   Mental health conditions like depression and anxiety  Your doctor will talk to you about the different types of screening tests. They can help you decide which screenings to have. They can also explain what the results might mean.   Answering questions - The physical is a good time to ask the doctor or nurse questions about your health. If needed, they can refer you to other doctors or specialists, too.  Adults older than 65 years often need other care, too. As you get older, your doctor will talk to you about:   How to prevent falling at " home   Hearing or vision tests   Memory testing   How to take your medicines safely   Making sure that you have the help and support you need at home  All topics are updated as new evidence becomes available and our peer review process is complete.  This topic retrieved from [facility] on: May 02, 2024.  Topic 019291 Version 1.0  Release: 32.4.3 - C32.122  © 2024 UpToDate, Inc. and/or its affiliates. All rights reserved.  Consumer Information Use and Disclaimer   Disclaimer: This generalized information is a limited summary of diagnosis, treatment, and/or medication information. It is not meant to be comprehensive and should be used as a tool to help the user understand and/or assess potential diagnostic and treatment options. It does NOT include all information about conditions, treatments, medications, side effects, or risks that may apply to a specific patient. It is not intended to be medical advice or a substitute for the medical advice, diagnosis, or treatment of a health care provider based on the health care provider's examination and assessment of a patient's specific and unique circumstances. Patients must speak with a health care provider for complete information about their health, medical questions, and treatment options, including any risks or benefits regarding use of medications. This information does not endorse any treatments or medications as safe, effective, or approved for treating a specific patient. UpToDate, Inc. and its affiliates disclaim any warranty or liability relating to this information or the use thereof.The use of this information is governed by the Terms of Use, available at https://www.woltersFeedMagnetuwer.com/en/know/clinical-effectiveness-terms. 2024© UpToDate, Inc. and its affiliates and/or licensors. All rights reserved.  Copyright   © 2024 UpToDate, Inc. and/or its affiliates. All rights reserved.

## 2024-09-10 NOTE — PROGRESS NOTES
Adult Annual Physical  Name: Maynor Woods      : 1985      MRN: [id number]  Encounter Provider: Eugene Juarez MD  Encounter Date: 9/10/2024   Encounter department: [facility]    Assessment & Plan   1. Annual physical exam  2. BMI 24.0-24.9, adult  3. Hyperlipidemia, unspecified hyperlipidemia type  Assessment & Plan:  Stable, patient compliant with Lipitor 20 mg daily.  Will continue to monitor.  4. Vitamin D deficiency  Assessment & Plan:  Stable, patient currently taking vitamin D 50,000 units biweekly.  Will continue to monitor.  5. Exercise counseling  6. Dietary counseling    Immunizations and preventive care screenings were discussed with patient today. Appropriate education was printed on patient's after visit summary.    Counseling:  Alcohol/drug use: discussed moderation in alcohol intake, the recommendations for healthy alcohol use, and avoidance of illicit drug use.  Dental Health: discussed importance of regular tooth brushing, flossing, and dental visits.  Exercise: the importance of regular exercise/physical activity was discussed. Recommend exercise 3-5 times per week for at least 30 minutes.          History of Present Illness     Adult Annual Physical:  Patient presents for annual physical.     Diet and Physical Activity:  - Diet/Nutrition:. takes fruit/vegetables supplements  - Exercise: 3-4 times a week on average, strength training exercises, moderate cardiovascular exercise and 30-60 minutes on average.    General Health:  - Sleep:. 6 to 7 hours  - Hearing: normal hearing right ear.  - Vision: no vision problems, wears glasses and goes for regular eye exams.  - Dental: brushes teeth twice daily and regular dental visits.     Health:  - History of STDs: no.   - Urinary symptoms: none.     Review of Systems   Constitutional:  Negative for chills and fever.   HENT:  Negative for sore throat.    Respiratory:  Negative for cough and shortness of  breath.    Cardiovascular:  Negative for chest pain and palpitations.   Gastrointestinal:  Negative for abdominal pain, constipation, diarrhea, nausea and vomiting.   Genitourinary:  Negative for difficulty urinating and dysuria.   Neurological:  Negative for dizziness and headaches.         Objective     /78 (BP Location: Right arm, Patient Position: Sitting, Cuff Size: Standard)   Pulse 76   Temp 97.8 °F (36.6 °C) (Temporal)   Resp 16   Ht 6' (1.829 m)   Wt 81.6 kg (179 lb 12.8 oz)   SpO2 98%   BMI 24.39 kg/m²     Physical Exam  Constitutional:       General: He is not in acute distress.     Appearance: Normal appearance. He is not ill-appearing.   HENT:      Head: Normocephalic and atraumatic.      Right Ear: Tympanic membrane and ear canal normal.      Left Ear: Tympanic membrane and ear canal normal.      Nose: Nose normal. No congestion.      Mouth/Throat:      Mouth: Mucous membranes are moist.      Pharynx: Oropharynx is clear.   Eyes:      Extraocular Movements: Extraocular movements intact.      Conjunctiva/sclera: Conjunctivae normal.      Pupils: Pupils are equal, round, and reactive to light.   Cardiovascular:      Rate and Rhythm: Normal rate and regular rhythm.      Heart sounds: Normal heart sounds. No murmur heard.  Pulmonary:      Effort: Pulmonary effort is normal. No respiratory distress.      Breath sounds: Normal breath sounds. No stridor. No wheezing.   Abdominal:      General: Abdomen is flat. Bowel sounds are normal. There is no distension.      Palpations: Abdomen is soft. There is no mass.      Tenderness: There is no abdominal tenderness.      Hernia: No hernia is present.   Musculoskeletal:      Cervical back: Neck supple. No tenderness.      Right lower leg: No edema.      Left lower leg: No edema.   Neurological:      Mental Status: He is alert and oriented to person, place, and time.   Psychiatric:         Mood and Affect: Mood normal.         Behavior: Behavior normal.

## 2024-12-05 DIAGNOSIS — E78.49 OTHER HYPERLIPIDEMIA: ICD-10-CM

## 2024-12-05 RX ORDER — ATORVASTATIN CALCIUM 20 MG/1
20 TABLET, FILM COATED ORAL DAILY
Qty: 90 TABLET | Refills: 1 | Status: SHIPPED | OUTPATIENT
Start: 2024-12-05

## 2025-02-25 ENCOUNTER — OFFICE VISIT (OUTPATIENT)
Age: 40
End: 2025-02-25
Payer: COMMERCIAL

## 2025-02-25 VITALS
BODY MASS INDEX: 24.41 KG/M2 | TEMPERATURE: 97.6 F | DIASTOLIC BLOOD PRESSURE: 86 MMHG | RESPIRATION RATE: 16 BRPM | HEART RATE: 63 BPM | SYSTOLIC BLOOD PRESSURE: 126 MMHG | WEIGHT: 180.2 LBS | HEIGHT: 72 IN | OXYGEN SATURATION: 98 %

## 2025-02-25 DIAGNOSIS — K64.4 EXTERNAL HEMORRHOIDS: Primary | ICD-10-CM

## 2025-02-25 DIAGNOSIS — N50.9 LESION OF SKIN OF SCROTUM: ICD-10-CM

## 2025-02-25 DIAGNOSIS — M21.6X2 PRONATION OF BOTH FEET: ICD-10-CM

## 2025-02-25 DIAGNOSIS — M21.6X1 PRONATION OF BOTH FEET: ICD-10-CM

## 2025-02-25 DIAGNOSIS — M76.822 POSTERIOR TIBIAL TENDON DYSFUNCTION, BILATERAL: ICD-10-CM

## 2025-02-25 DIAGNOSIS — M76.821 POSTERIOR TIBIAL TENDON DYSFUNCTION, BILATERAL: ICD-10-CM

## 2025-02-25 DIAGNOSIS — K64.4 RESIDUAL HEMORRHOIDAL SKIN TAGS: ICD-10-CM

## 2025-02-25 PROBLEM — Z01.84 IMMUNITY STATUS TESTING: Status: RESOLVED | Noted: 2022-10-21 | Resolved: 2025-02-25

## 2025-02-25 PROBLEM — G89.29 CHRONIC PAIN OF RIGHT ANKLE: Status: RESOLVED | Noted: 2023-02-03 | Resolved: 2025-02-25

## 2025-02-25 PROBLEM — K40.90 INGUINAL HERNIA, LEFT: Status: RESOLVED | Noted: 2018-03-21 | Resolved: 2025-02-25

## 2025-02-25 PROBLEM — M25.551 RIGHT HIP PAIN: Status: RESOLVED | Noted: 2022-08-19 | Resolved: 2025-02-25

## 2025-02-25 PROBLEM — D17.9 LIPOMA: Status: RESOLVED | Noted: 2021-08-13 | Resolved: 2025-02-25

## 2025-02-25 PROBLEM — R73.01 IFG (IMPAIRED FASTING GLUCOSE): Status: RESOLVED | Noted: 2023-08-09 | Resolved: 2025-02-25

## 2025-02-25 PROBLEM — M25.532 LEFT WRIST PAIN: Status: RESOLVED | Noted: 2022-08-19 | Resolved: 2025-02-25

## 2025-02-25 PROBLEM — M25.512 ARTHRALGIA OF LEFT ACROMIOCLAVICULAR JOINT: Status: RESOLVED | Noted: 2022-08-19 | Resolved: 2025-02-25

## 2025-02-25 PROBLEM — M25.571 CHRONIC PAIN OF RIGHT ANKLE: Status: RESOLVED | Noted: 2023-02-03 | Resolved: 2025-02-25

## 2025-02-25 PROCEDURE — 99214 OFFICE O/P EST MOD 30 MIN: CPT | Performed by: STUDENT IN AN ORGANIZED HEALTH CARE EDUCATION/TRAINING PROGRAM

## 2025-02-25 RX ORDER — HYDROCORTISONE 25 MG/G
CREAM TOPICAL 2 TIMES DAILY
Qty: 28 G | Refills: 0 | Status: SHIPPED | OUTPATIENT
Start: 2025-02-25

## 2025-02-25 NOTE — PROGRESS NOTES
Name: Maynor Woods      : 1985      MRN: [id number]  Encounter Provider: Eugene Juarez MD  Encounter Date: 2025   Encounter department: [facility]  :  Assessment & Plan  External hemorrhoids    Orders:  •  Ambulatory Referral to Colorectal Surgery; Future  •  hydrocortisone (ANUSOL-HC) 2.5 % rectal cream; Apply topically 2 (two) times a day  Prescription for Anusol cream sent to pharmacy as indicated above.  Additionally, per patient request, referral to colorectal surgery placed.  Patient denies constipation and reports this is a recurrent issue for him.  Pronation of both feet    Orders:  •  Ambulatory Referral to Podiatry; Future  •  Ambulatory Referral to Orthopedic Surgery; Future  Referrals to podiatry and orthopedics placed per patient request.  Residual hemorrhoidal skin tags    Orders:  •  Ambulatory Referral to Colorectal Surgery; Future    Posterior tibial tendon dysfunction, bilateral    Orders:  •  Ambulatory Referral to Orthopedic Surgery; Future    Lesion of skin of scrotum       Possible ingrown hair, otherwise asymptomatic during today's visit.  Patient in agreement to monitor site and will reach out to office if lesion increases in size or fails to resolve.  Patient will also consider referral to dermatology and reach out to office if he becomes interested.         History of Present Illness   Rash  This is a chronic problem. The current episode started more than 1 month ago. The problem is unchanged. Pertinent negatives include no anorexia, cough, drinking less, diarrhea, fatigue, fever, itching, shortness of breath or sore throat. Past treatments include nothing.     Lesions on the right side of his scrotum, otherwise asymptomatic      Hemorrhoids, patient requesting referral for colorectal surgery.  Patient does report past history of hemorrhoidectomy.    Patient also notes history of pronation of both feet and was following yearly with podiatry  and orthopedics.  Patient however has stopped following and is requesting new referrals to continue follow-up with their offices.    Review of Systems   Constitutional:  Negative for fatigue and fever.   HENT:  Negative for sore throat.    Respiratory:  Negative for cough and shortness of breath.    Gastrointestinal:  Negative for anorexia and diarrhea.   Skin:  Positive for rash. Negative for itching.       Objective   /86 (BP Location: Left arm, Patient Position: Sitting, Cuff Size: Standard)   Pulse 63   Temp 97.6 °F (36.4 °C) (Temporal)   Resp 16   Ht 6' (1.829 m)   Wt 81.7 kg (180 lb 3.2 oz)   SpO2 98%   BMI 24.44 kg/m²      Physical Exam  Constitutional:       Appearance: Normal appearance.   HENT:      Head: Normocephalic and atraumatic.   Cardiovascular:      Rate and Rhythm: Normal rate and regular rhythm.      Heart sounds: Normal heart sounds. No murmur heard.  Pulmonary:      Breath sounds: Normal breath sounds. No wheezing.   Abdominal:      Palpations: Abdomen is soft.      Tenderness: There is no abdominal tenderness. There is no guarding or rebound.   Genitourinary:     Comments: Pea-sized skin lesion noted on right side of scrotum  Musculoskeletal:      Right lower leg: No edema.      Left lower leg: No edema.   Neurological:      Mental Status: He is alert and oriented to person, place, and time.   Psychiatric:         Mood and Affect: Mood normal.         Behavior: Behavior normal.

## 2025-02-25 NOTE — ASSESSMENT & PLAN NOTE
Orders:  •  Ambulatory Referral to Podiatry; Future  •  Ambulatory Referral to Orthopedic Surgery; Future  Referrals to podiatry and orthopedics placed per patient request.

## 2025-03-18 ENCOUNTER — APPOINTMENT (OUTPATIENT)
Dept: RADIOLOGY | Facility: AMBULARY SURGERY CENTER | Age: 40
End: 2025-03-18
Attending: ORTHOPAEDIC SURGERY
Payer: COMMERCIAL

## 2025-03-18 VITALS — HEIGHT: 72 IN | BODY MASS INDEX: 25.06 KG/M2 | WEIGHT: 185 LBS

## 2025-03-18 DIAGNOSIS — Z01.89 ENCOUNTER FOR LOWER EXTREMITY COMPARISON IMAGING STUDY: ICD-10-CM

## 2025-03-18 DIAGNOSIS — M76.821 POSTERIOR TIBIAL TENDON DYSFUNCTION, BILATERAL: ICD-10-CM

## 2025-03-18 DIAGNOSIS — M21.6X1 PRONATION OF BOTH FEET: ICD-10-CM

## 2025-03-18 DIAGNOSIS — M21.42 PES PLANUS OF BOTH FEET: Primary | ICD-10-CM

## 2025-03-18 DIAGNOSIS — M76.822 POSTERIOR TIBIAL TENDON DYSFUNCTION, BILATERAL: ICD-10-CM

## 2025-03-18 DIAGNOSIS — M21.6X2 PRONATION OF BOTH FEET: ICD-10-CM

## 2025-03-18 DIAGNOSIS — M21.41 PES PLANUS OF BOTH FEET: Primary | ICD-10-CM

## 2025-03-18 PROCEDURE — 73630 X-RAY EXAM OF FOOT: CPT

## 2025-03-18 PROCEDURE — 73600 X-RAY EXAM OF ANKLE: CPT

## 2025-03-18 PROCEDURE — 99213 OFFICE O/P EST LOW 20 MIN: CPT | Performed by: ORTHOPAEDIC SURGERY

## 2025-03-18 PROCEDURE — 73620 X-RAY EXAM OF FOOT: CPT

## 2025-03-18 NOTE — PROGRESS NOTES
James R Lachman, M.D.  Attending, Orthopaedic Surgery  Foot and Ankle  [facility]      ORTHOPAEDIC FOOT AND ANKLE CLINIC VISIT     Assessment:     Encounter Diagnoses   Name Primary?    Pes planus of both feet Yes    Encounter for lower extremity comparison imaging study     Pronation of both feet             Plan:   The patient verbalized understanding of exam findings and treatment plan. We engaged in the shared decision-making process and treatment options were discussed at length with the patient. Surgical and conservative management discussed today along with risks and benefits.  Patient has bilateral physiologic left foot deformity without pain  Patient perceives that his right foot appears to have more forefoot abduction than previously  Xray measurements consistent with 2 years ago.  We discussed the patient that he may benefit from an [place] brace to prevent further deformity, although in the absence of pain he may not experience significant relief or be motivated to wear it daily  Patient declined use of [place] brace at today's visit  We will see the patient back in 2 years to reevaluate his symptoms  Return in about 2 years (around 3/18/2027).      History of Present Illness:   Chief Complaint:   Chief Complaint   Patient presents with    Follow-up     Follow up right foot. Is getting worse.      Maynor Woods is a 39 y.o. male who is being seen in follow-up for Right flatfoot deformity.  Patient was previously seen about 2 years ago, at which time we recommended orthotic use as prescribed by podiatrist Dr. Garcia as well as recommendation of [place] brace if he had worsening symptoms or worsening deformity.  Patient states that he feels that his right forefoot has become moreabducted since prior visit although he continues not to have any symptoms.     Pain/symptom timing:  Worse during the day when active  Pain/symptom context:  Worse with activites and  work  Pain/symptom modifying factors:  Rest makes better, activities make worse  Pain/symptom associated signs/symptoms: none    Prior treatment   NSAIDsNo   Injections No   Bracing/Orthotics Yes    Physical Therapy No     Orthopedic Surgical History:   See below    Past Medical, Surgical and Social History:  Past Medical History:  has a past medical history of Allergic, Arthralgia of left acromioclavicular joint (08/19/2022), Chronic pain of right ankle (02/03/2023), Hyperlipemia, IFG (impaired fasting glucose) (08/09/2023), Immunity status testing (10/21/2022), Inguinal hernia, left (03/21/2018), Left wrist pain (08/19/2022), Lipoma (08/13/2021), and Right hip pain (08/19/2022).  Problem List: does not have any pertinent problems on file.  Past Surgical History:  has a past surgical history that includes Hernia repair; Hemorrhoid surgery; and Lipoma resection (02/2022).  Family History: family history includes Cancer in his maternal grandmother; Dementia in his paternal grandmother; Hyperlipidemia in his mother; Hypertension in his father; Throat cancer in his maternal grandfather.  Social History:  reports that he has never smoked. He has never used smokeless tobacco. He reports that he does not currently use alcohol. He reports current drug use. Drug: Marijuana.  Current Medications: has a current medication list which includes the following prescription(s): atorvastatin, hydrocortisone, and vitamin d (ergocalciferol).  Allergies: is allergic to pollen extract.     Review of Systems:  General- denies fever/chills  HEENT- denies hearing loss or sore throat  Eyes- denies eye pain or visual disturbances, denies red eyes  Respiratory- denies cough or SOB  Cardio- denies chest pain or palpitations  GI- denies abdominal pain  Endocrine- denies urinary frequency  Urinary- denies pain with urination  Musculoskeletal- Negative except noted above  Skin- denies rashes or wounds  Neurological- denies dizziness or  headache  Psychiatric- denies anxiety or difficulty concentrating    Physical Exam:   Ht 6' (1.829 m)   Wt 83.9 kg (185 lb)   BMI 25.09 kg/m²   General/Constitutional: No apparent distress: well-nourished and well developed.  Eyes: normal ocular motion  Lymphatic: No appreciable lymphadenopathy  Respiratory: Non-labored breathing  Vascular: No edema, swelling or tenderness, except as noted in detailed exam.  Integumentary: No impressive skin lesions present, except as noted in detailed exam.  Neuro: No ataxia or tremors noted  Psych: Normal mood and affect, oriented to person, place and time. Appropriate affect.  Musculoskeletal: Normal, except as noted in detailed exam and in HPI.    Examination    Right    Gait Normal   Musculoskeletal No tenderness to palpation    Skin Normal.      Nails Normal    Range of Motion  20 degrees dorsiflexion, 30 degrees plantarflexion  Subtalar motion: normal    Stability Stable    Muscle Strength 5/5 tibialis anterior  5/5 gastrocnemius-soleus  5/5 posterior tibialis  5/5 peroneal/eversion strength  5/5 EHL  5/5 FHL    Neurologic Normal    Sensation  Intact to light touch throughout sural, saphenous, superficial peroneal, deep peroneal and medial/lateral plantar nerve distributions.  [place] 5.07 filament (10g) testing deferred.    Cardiovascular Brisk capillary refill < 2 seconds,intact DP and PT pulses    Special Tests None      Imaging Studies:   3 views of the Right foot were obtained, reviewed and interpreted independently which demonstrate flatfoot deformity with unchanged radiographic parameters compared to prior visit in 2023. Reviewed by me personally.        James R. Lachman, MD  Foot & Ankle Surgery   Department of Orthopaedic Surgery  [facility]      I personally performed the service.    James R. Lachman, MD

## 2025-08-11 ENCOUNTER — TELEPHONE (OUTPATIENT)
Age: 40
End: 2025-08-11

## 2025-08-11 ENCOUNTER — APPOINTMENT (OUTPATIENT)
Dept: LAB | Facility: MEDICAL CENTER | Age: 40
End: 2025-08-11